# Patient Record
Sex: MALE | Employment: UNEMPLOYED | URBAN - METROPOLITAN AREA
[De-identification: names, ages, dates, MRNs, and addresses within clinical notes are randomized per-mention and may not be internally consistent; named-entity substitution may affect disease eponyms.]

---

## 2019-02-23 ENCOUNTER — HOSPITAL ENCOUNTER (EMERGENCY)
Facility: CLINIC | Age: 24
Discharge: HOME | End: 2019-02-24
Attending: NURSE PRACTITIONER | Admitting: NURSE PRACTITIONER
Payer: COMMERCIAL

## 2019-02-23 DIAGNOSIS — T50.905A ADVERSE EFFECT OF DRUG, INITIAL ENCOUNTER: ICD-10-CM

## 2019-02-23 PROCEDURE — 99283 EMERGENCY DEPT VISIT LOW MDM: CPT | Performed by: NURSE PRACTITIONER

## 2019-02-23 PROCEDURE — 25000132 ZZH RX MED GY IP 250 OP 250 PS 637: Performed by: NURSE PRACTITIONER

## 2019-02-23 PROCEDURE — 99284 EMERGENCY DEPT VISIT MOD MDM: CPT | Mod: Z6 | Performed by: NURSE PRACTITIONER

## 2019-02-23 PROCEDURE — 25000131 ZZH RX MED GY IP 250 OP 636 PS 637: Performed by: NURSE PRACTITIONER

## 2019-02-23 RX ORDER — LORAZEPAM 1 MG/1
1 TABLET ORAL ONCE
Status: COMPLETED | OUTPATIENT
Start: 2019-02-23 | End: 2019-02-23

## 2019-02-23 RX ORDER — ONDANSETRON 4 MG/1
4 TABLET, ORALLY DISINTEGRATING ORAL ONCE
Status: COMPLETED | OUTPATIENT
Start: 2019-02-23 | End: 2019-02-23

## 2019-02-23 RX ADMIN — ONDANSETRON 4 MG: 4 TABLET, ORALLY DISINTEGRATING ORAL at 23:10

## 2019-02-23 RX ADMIN — LORAZEPAM 1 MG: 1 TABLET ORAL at 23:08

## 2019-02-23 NOTE — ED AVS SNAPSHOT
[facility] Emergency Department  [address] [place] [address]  Phone:  [phone]  Fax:  [phone]                                    Deven Lake   MRN: [id number]    Department:  [facility] Emergency Department   Date of Visit:  2/23/2019           After Visit Summary Signature Page    I have received my discharge instructions, and my questions have been answered. I have discussed any challenges I see with this plan with the nurse or doctor.    ..........................................................................................................................................  Patient/Patient Representative Signature      ..........................................................................................................................................  Patient Representative Print Name and Relationship to Patient    ..................................................               ................................................  Date                                   Time    ..........................................................................................................................................  Reviewed by Signature/Title    ...................................................              ..............................................  Date                                               Time          22EPIC Rev 08/18

## 2019-02-24 ENCOUNTER — HOSPITAL ENCOUNTER (EMERGENCY)
Facility: CLINIC | Age: 24
Discharge: HOME | End: 2019-02-24
Attending: FAMILY MEDICINE | Admitting: FAMILY MEDICINE
Payer: COMMERCIAL

## 2019-02-24 ENCOUNTER — NURSE TRIAGE (OUTPATIENT)
Dept: NURSING | Facility: CLINIC | Age: 24
End: 2019-02-24

## 2019-02-24 VITALS
SYSTOLIC BLOOD PRESSURE: 123 MMHG | WEIGHT: 119 LBS | OXYGEN SATURATION: 97 % | RESPIRATION RATE: 16 BRPM | BODY MASS INDEX: 17.57 KG/M2 | TEMPERATURE: 97.3 F | HEART RATE: 79 BPM | DIASTOLIC BLOOD PRESSURE: 84 MMHG

## 2019-02-24 VITALS
TEMPERATURE: 97.3 F | DIASTOLIC BLOOD PRESSURE: 78 MMHG | SYSTOLIC BLOOD PRESSURE: 106 MMHG | HEART RATE: 62 BPM | OXYGEN SATURATION: 99 % | RESPIRATION RATE: 16 BRPM

## 2019-02-24 DIAGNOSIS — T62.0X2S: ICD-10-CM

## 2019-02-24 DIAGNOSIS — R11.0 NAUSEA: ICD-10-CM

## 2019-02-24 PROCEDURE — 99282 EMERGENCY DEPT VISIT SF MDM: CPT | Performed by: FAMILY MEDICINE

## 2019-02-24 PROCEDURE — 99284 EMERGENCY DEPT VISIT MOD MDM: CPT | Mod: Z6 | Performed by: FAMILY MEDICINE

## 2019-02-24 RX ORDER — ONDANSETRON 4 MG/1
4 TABLET, ORALLY DISINTEGRATING ORAL EVERY 8 HOURS PRN
Qty: 10 TABLET | Refills: 0 | Status: SHIPPED | OUTPATIENT
Start: 2019-02-24 | End: 2019-02-27

## 2019-02-24 ASSESSMENT — ENCOUNTER SYMPTOMS
MUSCULOSKELETAL NEGATIVE: 1
VOMITING: 1
CARDIOVASCULAR NEGATIVE: 1
TREMORS: 1
RESPIRATORY NEGATIVE: 1
NAUSEA: 1

## 2019-02-24 NOTE — TELEPHONE ENCOUNTER
"Deven is feeling dizzy.  Deven can walk but head is dizzy.  Deven was just released from hospital yesterday for ingesting mushrooms.  Denies vomiting, is hydrated.  Denies fever.      Reason for Disposition    Known or suspected alcohol or drug abuse    Additional Information    Negative: Severe difficulty breathing (e.g., struggling for each breath, speaks in single words)    Negative: [1] Difficulty breathing or swallowing AND [2] started suddenly after medicine, an allergic food or bee sting    Negative: Shock suspected (e.g., cold/pale/clammy skin, too weak to stand, low BP, rapid pulse)    Negative: Difficult to awaken or acting confused  (e.g., disoriented, slurred speech)    Negative: [1] Weakness (i.e., paralysis, loss of muscle strength) of the face, arm or leg on one side of the body AND [2] sudden onset AND [3] present now    Negative: [1] Numbness (i.e., loss of sensation) of the face, arm or leg on one side of the body AND [2] sudden onset AND [3] present now    Negative: [1] Loss of speech or garbled speech AND [2] sudden onset AND [3] present now    Negative: Overdose (accidental or intentional) of medications    Negative: [1] Fainted > 15 minutes ago AND [2] still feels too weak or dizzy to stand    Negative: Heart beating < 50 beats per minute OR > 140 beats per minute    Negative: Sounds like a life-threatening emergency to the triager    Negative: Difficulty breathing    Negative: SEVERE dizziness (e.g., unable to stand, requires support to walk, feels like passing out now)    Negative: Extra heart beats OR irregular heart beating  (i.e., \"palpitations\")    Negative: [1] Drinking very little AND [2] dehydration suspected (e.g., no urine > 12 hours, very dry mouth, very lightheaded)    Negative: Patient sounds very sick or weak to the triager    Negative: [1] Dizziness caused by heat exposure, sudden standing, or poor fluid intake AND [2] no improvement after 2 hours of rest and fluids    " Negative: [1] Fever > 103 F (39.4 C) AND [2] not able to get the fever down using Fever Care Advice    Negative: [1] Fever > 101 F (38.3 C) AND [2] age > 60    Negative: [1] Fever > 101 F (38.3 C) AND [2] bedridden (e.g., nursing home patient, CVA, chronic illness, recovering from surgery)    Negative: [1] Fever > 100.5 F (38.1 C) AND [2] diabetes mellitus or weak immune system (e.g., HIV positive, cancer chemo, splenectomy, organ transplant, chronic steroids)    Negative: [1] MODERATE dizziness (e.g., interferes with normal activities) AND [2] has NOT been evaluated by physician for this  (Exception: dizziness caused by heat exposure, sudden standing, or poor fluid intake)    Negative: Fever present > 3 days (72 hours)    Negative: Taking a medicine that could cause dizziness (e.g., blood pressure medications, diuretics)    Negative: [1] MODERATE dizziness (e.g., interferes with normal activities) AND [2] has been evaluated by physician for this    Negative: [1] MILD dizziness (e.g., walking normally) AND [2] has NOT been evaluated by physician for this  (Exception: dizziness caused by heat exposure, sudden standing, or poor fluid intake)    Protocols used: DIZZINESS - LIGHTHEADEDNESS-ADULT-

## 2019-02-24 NOTE — ED AVS SNAPSHOT
[facility] Emergency Department  [address] [place] [address]  Phone:  [phone]  Fax:  [phone]                                    Deven Lake   MRN: [id number]    Department:  [facility] Emergency Department   Date of Visit:  2/24/2019           After Visit Summary Signature Page    I have received my discharge instructions, and my questions have been answered. I have discussed any challenges I see with this plan with the nurse or doctor.    ..........................................................................................................................................  Patient/Patient Representative Signature      ..........................................................................................................................................  Patient Representative Print Name and Relationship to Patient    ..................................................               ................................................  Date                                   Time    ..........................................................................................................................................  Reviewed by Signature/Title    ...................................................              ..............................................  Date                                               Time          22EPIC Rev 08/18

## 2019-02-24 NOTE — ED TRIAGE NOTES
"Pt presents with anxiety and nausea after taking one stem of mushroom. Pt states \"he hasn't had a trip like this before.\" Pt denies other drug use.   "

## 2019-02-24 NOTE — ED PROVIDER NOTES
"  History     Chief Complaint   Patient presents with     Ingestion     Pt took one stem of mushroom at approx 2100. Pt now feels anxious and nauseated.      HPI  Deven Lake is a 23 year old male who was brought in by EMS due to ingestion of mushrooms. Patient states he \"had a bad trip\" that caused him to have excess worry. States he ingested two grams of mushrooms approximately three hours ago. He reports he has also felt nauseated and had vomited one time that coincided with an episode of sweating. Patient reports that his \"legs won't stop shaking\". He also states that his teeth and jaw are clenching, which he reports occurs every time he \"trips\". Patient feels nauseated at this time. Denies difficulty swallowing, difficulty breathing, or chest pain. Denies use of tobacco or other drugs. Denies hearing or seeing anything unexpected in the room. He denies any suicidal ideation.     Allergies:  Allergies   Allergen Reactions     No Known Allergies        Problem List:    Patient Active Problem List    Diagnosis Date Noted     Mild persistent asthma 10/31/2008     Priority: Medium        Past Medical History:    No past medical history on file.    Past Surgical History:    No past surgical history on file.    Family History:    No family history on file.    Social History:  Marital Status:  Single [1]  Social History     Tobacco Use     Smoking status: Never Smoker   Substance Use Topics     Alcohol use: No     Drug use: Yes     Comment: mushroom         Medications:      ALBUTEROL SULFATE 0.083 % IN NEBU   venlafaxine (EFFEXOR XR) 37.5 MG 24 hr capsule         Review of Systems   Constitutional:        Reports symptoms of feeling hot and cold.    HENT: Negative.    Respiratory: Negative.    Cardiovascular: Negative.    Gastrointestinal: Positive for nausea and vomiting.   Genitourinary: Negative.    Musculoskeletal: Negative.    Neurological: Positive for tremors.       Physical Exam   BP: 132/88  Pulse: " 79  Heart Rate: 79  Temp: 97.3  F (36.3  C)  Resp: 16  SpO2: 99 %      Physical Exam   Constitutional: He is oriented to person, place, and time. No distress.   HENT:   Nose: Nose normal.   Mouth/Throat: No oropharyngeal exudate.   Eyes: Pupils are equal, round, and reactive to light.   Neck: Normal range of motion. Neck supple.   Cardiovascular: Normal rate, regular rhythm, normal heart sounds and intact distal pulses.   Pulmonary/Chest: Effort normal and breath sounds normal. No respiratory distress.   Abdominal: Soft. Bowel sounds are normal. He exhibits no distension. There is no tenderness.   Neurological: He is alert and oriented to person, place, and time.   Bilateral legs minimally tremulous. No tremors present to bilateral hands. Patient denies hearing or seeing anything unexpected in the room.    Skin: Skin is warm and dry. He is not diaphoretic.       ED Course    Patient administered zofran for management of nausea, and ativan for symptoms associated with drug ingestion/anxiety. Vital signs remained stable during visit. Patient monitored for safety during his stay.        Procedures      No results found for this or any previous visit (from the past 24 hour(s)).    Medications   LORazepam (ATIVAN) tablet 1 mg (1 mg Oral Given 2/23/19 2308)   ondansetron (ZOFRAN-ODT) ODT tab 4 mg (4 mg Oral Given 2/23/19 2310)       Assessments & Plan (with Medical Decision Making)  Patient is a 23 year old male who presents due to adverse effects from the ingestion of mushrooms. Patient's vital signs remained stable during his stay.  He was administered zofran for the management of nausea, and ativan for symptoms associated with the mushroom ingestion.   Patient tolerating fluids. Given that patient denies any chest pain, difficulty breathing, or difficulty swallowing, is tolerating oral fluids, and is vitally stable, patient appears stable for discharge. He is feeling much better. Patient advised that he is unable to be  discharged to home unless he has transportation. Patient encouraged to stop using drugs. Patient is agreeable to discharge plan, and remains in the department until he has transportation home.      I have reviewed the nursing notes.    I have reviewed the findings, diagnosis, plan and need for follow up with the patient.       Medication List      There are no discharge medications for this visit.         Final diagnoses:   Adverse effect of drug, initial encounter - [facility]       2/23/2019   [facility] EMERGENCY DEPARTMENT     Jaida Tello, RANJEET CNP  02/24/19 0036

## 2019-02-24 NOTE — ED NOTES
Bed: ED04  Expected date: 2/23/19  Expected time:   Means of arrival:   Comments:  EMS mushroom ingestion :)

## 2019-02-25 ENCOUNTER — TELEPHONE (OUTPATIENT)
Dept: FAMILY MEDICINE | Facility: CLINIC | Age: 24
End: 2019-02-25

## 2019-02-25 NOTE — TELEPHONE ENCOUNTER
": 1995  PHONE #'s: [phone] (home)     PRESENTING PROBLEM:  Seen in the ER last night. Still feeling dizzy. Is this normal?    NURSING ASSESSMENT  Description:  \" I was seen for dizziness,  Nausea, and vomiting , after eating some psychodelic mushrooms that I had cooked up.  \"   Onset/duration:  19  Precip. factors:   Cooked up some psychodelic mushrooms on 19  Assoc. Sx:  Tired, still dizzy, and somewhat nauseated  Improves/worsens Sx:  Has NOT vomited since 7 pm last night.   Pain scale (1-10)   0/10  Sx specific meds:  Sent home with Zofran, but hasn't taken recently.   Last exam/Tx:  ER on 19    RECOMMENDED DISPOSITION:  Home care advice - He needs to replace the electrolytes he has lost from vomiting Saturday thru . \" I have only been drinking water. \"   RN asked when he last urinated.?  \" 20 minutes ago. Normal amount.\"   When have you last eaten?  \" I have just been nibbling on some saltine crackers since Saturday. Haven't eaten much of anything.\"    RN recommends he start drinking some Gatorade or Propel to replace the NA+ and K+ that he has lost with vomiting. Also, since he has NOT vomited since 7 PM last night, he should advance his diet. Maybe try some bland chicken noodle soup. This is high in sodium so should help with that , and has noodles in it and small amount of chicken for protein.  Could try some mashed potatoes,  Cooked noodles, cooked eggs- bland. To see feel better after getting some nutrition to feed your body.    The ER provider said to give it a few days to start feeling better. If dizziness continues > 7 days needs to see a provider.     Will comply with recommendation: YES  If further questions/concerns or if Sx do not improve, worsen or new Sx develop, call your PCP or [facility] as soon as possible.    NOTES:  Disposition was determined by the first positive assessment question, therefore all previous assessment questions were negative.  " Informed to check provider manual or call insurance company to assure coverage.    Guideline used: Dizziness  Telephone Triage Protocols for Nurses, Fifth Edition, Maegan Galvez RN  February 25, 2019

## 2019-02-25 NOTE — ED PROVIDER NOTES
History     Chief Complaint   Patient presents with     Dizziness     Pt was here last nite after doing mushrooms. Nausea.      HPI  Deven Lake is a 23 year old male who presents with concerns of continued nausea.  Patient was seen here yesterday after doing mushrooms for the first time.  Yesterday he received some Zofran and Ativan here in the department.  He was doing okay last night but then he woke up this morning he was feeling nauseous again.  Patient has been drinking water today but whenever he is tried to take anything more, he is gotten more nauseous and is throwing up.  Patient denies any abdominal pain, denies any fevers or chills.  Patient has not done any more mushrooms since seen last.    Allergies:  Allergies   Allergen Reactions     No Known Allergies        Problem List:    Patient Active Problem List    Diagnosis Date Noted     Mild persistent asthma 10/31/2008     Priority: Medium        Past Medical History:    No past medical history on file.    Past Surgical History:    No past surgical history on file.    Family History:    No family history on file.    Social History:  Marital Status:  Single [1]  Social History     Tobacco Use     Smoking status: Never Smoker   Substance Use Topics     Alcohol use: No     Drug use: Yes     Comment: mushroom         Medications:      ALBUTEROL SULFATE 0.083 % IN NEBU   venlafaxine (EFFEXOR XR) 37.5 MG 24 hr capsule         Review of Systems   All other systems reviewed and are negative.      Physical Exam          Physical Exam   Constitutional: He is oriented to person, place, and time. He appears well-developed and well-nourished. No distress.   HENT:   Head: Normocephalic and atraumatic.   Mouth/Throat: Oropharynx is clear and moist.   Eyes: Conjunctivae are normal.   Neck: Normal range of motion.   Cardiovascular: Normal rate, regular rhythm and normal heart sounds.   No murmur heard.  Pulmonary/Chest: Effort normal and breath sounds normal. No  stridor. No respiratory distress. He has no wheezes.   Abdominal: Soft. Bowel sounds are normal. He exhibits no distension. There is no tenderness. There is no guarding.   Musculoskeletal: Normal range of motion. He exhibits no edema.   Neurological: He is alert and oriented to person, place, and time. He has normal strength.   Skin: Skin is warm and dry. No rash noted. He is not diaphoretic.   Psychiatric: He has a normal mood and affect. Judgment normal.   Nursing note and vitals reviewed.      ED Course        Procedures           Exam is normal with normal vitals.  I think patient is just having residual symptoms from the mushrooms he did the other day.  I told him some of the symptoms may last for a few days.  He was not sent home with any nausea medicine yesterday so I will discharge him home with some Zofran.  Patient was told to stick with a clear liquid diet tonight and slowly advance as tolerated.  All questions were answered and patient is okay with this plan.    Assessments & Plan (with Medical Decision Making)  Mushroom intoxication, nausea     I have reviewed the nursing notes.    I have reviewed the findings, diagnosis, plan and need for follow up with the patient.              2/24/2019   [facility] EMERGENCY DEPARTMENT     John Gleason MD  02/24/19 2011

## 2019-02-25 NOTE — TELEPHONE ENCOUNTER
"Reason for Call/Nurse Assessment:  Deven calling FNA back with worsening symptoms. Reports he continues to be dizzy and is now vomiting. Reports \"I can't keep anything down\". Patient seen in ER yesterday due to ingestion of mushrooms.     RN Action/Disposition:  Patient currently at home with his 21 year old brother. Advised caller to return to the ER now due to worsening symptoms.     Caller verbalized understanding of care advice given and plans to come back to [facility] ER now. Caller had no further questions.     Sima Mcbride RN  [facility]      "

## 2019-02-26 ENCOUNTER — HOSPITAL ENCOUNTER (EMERGENCY)
Facility: CLINIC | Age: 24
Discharge: HOME | End: 2019-02-26
Attending: FAMILY MEDICINE | Admitting: FAMILY MEDICINE
Payer: COMMERCIAL

## 2019-02-26 ENCOUNTER — NURSE TRIAGE (OUTPATIENT)
Dept: NURSING | Facility: CLINIC | Age: 24
End: 2019-02-26

## 2019-02-26 VITALS
SYSTOLIC BLOOD PRESSURE: 111 MMHG | DIASTOLIC BLOOD PRESSURE: 83 MMHG | OXYGEN SATURATION: 98 % | RESPIRATION RATE: 16 BRPM | HEART RATE: 83 BPM | TEMPERATURE: 97.6 F

## 2019-02-26 DIAGNOSIS — R42 DIZZINESS: ICD-10-CM

## 2019-02-26 DIAGNOSIS — R11.0 NAUSEA: ICD-10-CM

## 2019-02-26 LAB
ALBUMIN SERPL-MCNC: 4.2 G/DL (ref 3.4–5)
ALP SERPL-CCNC: 93 U/L (ref 40–150)
ALT SERPL W P-5'-P-CCNC: 33 U/L (ref 0–70)
AMPHETAMINES UR QL: NOT DETECTED NG/ML
ANION GAP SERPL CALCULATED.3IONS-SCNC: 5 MMOL/L (ref 3–14)
AST SERPL W P-5'-P-CCNC: 17 U/L (ref 0–45)
BARBITURATES UR QL SCN: NOT DETECTED NG/ML
BASOPHILS # BLD AUTO: 0 10E9/L (ref 0–0.2)
BASOPHILS NFR BLD AUTO: 0.3 %
BENZODIAZ UR QL SCN: NOT DETECTED NG/ML
BILIRUB SERPL-MCNC: 0.5 MG/DL (ref 0.2–1.3)
BUN SERPL-MCNC: 9 MG/DL (ref 7–30)
BUPRENORPHINE UR QL: NOT DETECTED NG/ML
CALCIUM SERPL-MCNC: 8.9 MG/DL (ref 8.5–10.1)
CANNABINOIDS UR QL: ABNORMAL NG/ML
CHLORIDE SERPL-SCNC: 106 MMOL/L (ref 94–109)
CO2 SERPL-SCNC: 29 MMOL/L (ref 20–32)
COCAINE UR QL SCN: NOT DETECTED NG/ML
CREAT SERPL-MCNC: 0.89 MG/DL (ref 0.66–1.25)
D-METHAMPHET UR QL: NOT DETECTED NG/ML
DIFFERENTIAL METHOD BLD: NORMAL
EOSINOPHIL NFR BLD AUTO: 0.9 %
ERYTHROCYTE [DISTWIDTH] IN BLOOD BY AUTOMATED COUNT: 11.8 % (ref 10–15)
GFR SERPL CREATININE-BSD FRML MDRD: >90 ML/MIN/{1.73_M2}
GLUCOSE SERPL-MCNC: 122 MG/DL (ref 70–99)
HCT VFR BLD AUTO: 43.5 % (ref 40–53)
HGB BLD-MCNC: 15.3 G/DL (ref 13.3–17.7)
IMM GRANULOCYTES # BLD: 0 10E9/L (ref 0–0.4)
IMM GRANULOCYTES NFR BLD: 0.1 %
LYMPHOCYTES # BLD AUTO: 2.2 10E9/L (ref 0.8–5.3)
LYMPHOCYTES NFR BLD AUTO: 32.6 %
MCH RBC QN AUTO: 30.1 PG (ref 26.5–33)
MCHC RBC AUTO-ENTMCNC: 35.2 G/DL (ref 31.5–36.5)
MCV RBC AUTO: 86 FL (ref 78–100)
METHADONE UR QL SCN: NOT DETECTED NG/ML
MONOCYTES # BLD AUTO: 0.6 10E9/L (ref 0–1.3)
MONOCYTES NFR BLD AUTO: 9.2 %
NEUTROPHILS # BLD AUTO: 3.8 10E9/L (ref 1.6–8.3)
NEUTROPHILS NFR BLD AUTO: 56.9 %
NRBC # BLD AUTO: 0 10*3/UL
NRBC BLD AUTO-RTO: 0 /100
OPIATES UR QL SCN: NOT DETECTED NG/ML
OXYCODONE UR QL SCN: NOT DETECTED NG/ML
PCP UR QL SCN: NOT DETECTED NG/ML
PLATELET # BLD AUTO: 292 10E9/L (ref 150–450)
POTASSIUM SERPL-SCNC: 3.7 MMOL/L (ref 3.4–5.3)
PROPOXYPH UR QL: NOT DETECTED NG/ML
PROT SERPL-MCNC: 7.5 G/DL (ref 6.8–8.8)
RBC # BLD AUTO: 5.08 10E12/L (ref 4.4–5.9)
SODIUM SERPL-SCNC: 140 MMOL/L (ref 133–144)
TRICYCLICS UR QL SCN: NOT DETECTED NG/ML
WBC # BLD AUTO: 6.7 10E9/L (ref 4–11)

## 2019-02-26 PROCEDURE — 96374 THER/PROPH/DIAG INJ IV PUSH: CPT | Performed by: FAMILY MEDICINE

## 2019-02-26 PROCEDURE — 99284 EMERGENCY DEPT VISIT MOD MDM: CPT | Mod: 25 | Performed by: FAMILY MEDICINE

## 2019-02-26 PROCEDURE — 25000128 H RX IP 250 OP 636: Performed by: FAMILY MEDICINE

## 2019-02-26 PROCEDURE — 85025 COMPLETE CBC W/AUTO DIFF WBC: CPT | Performed by: FAMILY MEDICINE

## 2019-02-26 PROCEDURE — 99284 EMERGENCY DEPT VISIT MOD MDM: CPT | Mod: Z6 | Performed by: FAMILY MEDICINE

## 2019-02-26 PROCEDURE — 80306 DRUG TEST PRSMV INSTRMNT: CPT | Performed by: FAMILY MEDICINE

## 2019-02-26 PROCEDURE — 96361 HYDRATE IV INFUSION ADD-ON: CPT | Performed by: FAMILY MEDICINE

## 2019-02-26 PROCEDURE — 80053 COMPREHEN METABOLIC PANEL: CPT | Performed by: FAMILY MEDICINE

## 2019-02-26 RX ORDER — SODIUM CHLORIDE 9 MG/ML
1000 INJECTION, SOLUTION INTRAVENOUS CONTINUOUS
Status: DISCONTINUED | OUTPATIENT
Start: 2019-02-26 | End: 2019-02-27 | Stop reason: HOSPADM

## 2019-02-26 RX ORDER — ONDANSETRON 2 MG/ML
4 INJECTION INTRAMUSCULAR; INTRAVENOUS EVERY 30 MIN PRN
Status: DISCONTINUED | OUTPATIENT
Start: 2019-02-26 | End: 2019-02-27 | Stop reason: HOSPADM

## 2019-02-26 RX ADMIN — SODIUM CHLORIDE 1000 ML: 9 INJECTION, SOLUTION INTRAVENOUS at 20:16

## 2019-02-26 RX ADMIN — ONDANSETRON 4 MG: 2 INJECTION INTRAMUSCULAR; INTRAVENOUS at 20:16

## 2019-02-26 NOTE — ED AVS SNAPSHOT
[facility] Emergency Department  [address] [place] [address]  Phone:  [phone]  Fax:  [phone]                                    Deven Lake   MRN: [id number]    Department:  [facility] Emergency Department   Date of Visit:  2/26/2019           After Visit Summary Signature Page    I have received my discharge instructions, and my questions have been answered. I have discussed any challenges I see with this plan with the nurse or doctor.    ..........................................................................................................................................  Patient/Patient Representative Signature      ..........................................................................................................................................  Patient Representative Print Name and Relationship to Patient    ..................................................               ................................................  Date                                   Time    ..........................................................................................................................................  Reviewed by Signature/Title    ...................................................              ..............................................  Date                                               Time          22EPIC Rev 08/18

## 2019-02-27 ENCOUNTER — NURSE TRIAGE (OUTPATIENT)
Dept: NURSING | Facility: CLINIC | Age: 24
End: 2019-02-27

## 2019-02-27 ENCOUNTER — HOSPITAL ENCOUNTER (EMERGENCY)
Facility: CLINIC | Age: 24
Discharge: HOME | End: 2019-02-27
Attending: FAMILY MEDICINE | Admitting: FAMILY MEDICINE
Payer: COMMERCIAL

## 2019-02-27 VITALS
OXYGEN SATURATION: 99 % | DIASTOLIC BLOOD PRESSURE: 70 MMHG | RESPIRATION RATE: 16 BRPM | HEART RATE: 70 BPM | HEIGHT: 69 IN | BODY MASS INDEX: 17.63 KG/M2 | SYSTOLIC BLOOD PRESSURE: 110 MMHG | TEMPERATURE: 98.1 F | WEIGHT: 119 LBS

## 2019-02-27 DIAGNOSIS — G47.00 INSOMNIA, UNSPECIFIED TYPE: ICD-10-CM

## 2019-02-27 DIAGNOSIS — R11.0 NAUSEA: ICD-10-CM

## 2019-02-27 LAB
ALBUMIN SERPL-MCNC: 4.4 G/DL (ref 3.4–5)
ALP SERPL-CCNC: 93 U/L (ref 40–150)
ALT SERPL W P-5'-P-CCNC: 33 U/L (ref 0–70)
ANION GAP SERPL CALCULATED.3IONS-SCNC: 8 MMOL/L (ref 3–14)
AST SERPL W P-5'-P-CCNC: 25 U/L (ref 0–45)
BASOPHILS # BLD AUTO: 0 10E9/L (ref 0–0.2)
BASOPHILS NFR BLD AUTO: 0.3 %
BILIRUB SERPL-MCNC: 0.8 MG/DL (ref 0.2–1.3)
BUN SERPL-MCNC: 8 MG/DL (ref 7–30)
CALCIUM SERPL-MCNC: 8.8 MG/DL (ref 8.5–10.1)
CHLORIDE SERPL-SCNC: 106 MMOL/L (ref 94–109)
CO2 SERPL-SCNC: 26 MMOL/L (ref 20–32)
CREAT SERPL-MCNC: 0.86 MG/DL (ref 0.66–1.25)
DIFFERENTIAL METHOD BLD: NORMAL
EOSINOPHIL NFR BLD AUTO: 0.1 %
ERYTHROCYTE [DISTWIDTH] IN BLOOD BY AUTOMATED COUNT: 11.9 % (ref 10–15)
GFR SERPL CREATININE-BSD FRML MDRD: >90 ML/MIN/{1.73_M2}
GLUCOSE SERPL-MCNC: 106 MG/DL (ref 70–99)
HCT VFR BLD AUTO: 41.2 % (ref 40–53)
HGB BLD-MCNC: 14.4 G/DL (ref 13.3–17.7)
IMM GRANULOCYTES # BLD: 0 10E9/L (ref 0–0.4)
IMM GRANULOCYTES NFR BLD: 0.3 %
LIPASE SERPL-CCNC: 86 U/L (ref 73–393)
LYMPHOCYTES # BLD AUTO: 1.5 10E9/L (ref 0.8–5.3)
LYMPHOCYTES NFR BLD AUTO: 19.7 %
MCH RBC QN AUTO: 30.2 PG (ref 26.5–33)
MCHC RBC AUTO-ENTMCNC: 35 G/DL (ref 31.5–36.5)
MCV RBC AUTO: 86 FL (ref 78–100)
MONOCYTES # BLD AUTO: 0.5 10E9/L (ref 0–1.3)
MONOCYTES NFR BLD AUTO: 6.2 %
NEUTROPHILS # BLD AUTO: 5.6 10E9/L (ref 1.6–8.3)
NEUTROPHILS NFR BLD AUTO: 73.4 %
NRBC # BLD AUTO: 0 10*3/UL
NRBC BLD AUTO-RTO: 0 /100
PLATELET # BLD AUTO: 279 10E9/L (ref 150–450)
POTASSIUM SERPL-SCNC: 4.1 MMOL/L (ref 3.4–5.3)
PROT SERPL-MCNC: 8 G/DL (ref 6.8–8.8)
RBC # BLD AUTO: 4.77 10E12/L (ref 4.4–5.9)
SODIUM SERPL-SCNC: 140 MMOL/L (ref 133–144)
WBC # BLD AUTO: 7.6 10E9/L (ref 4–11)

## 2019-02-27 PROCEDURE — 80053 COMPREHEN METABOLIC PANEL: CPT | Performed by: FAMILY MEDICINE

## 2019-02-27 PROCEDURE — 36415 COLL VENOUS BLD VENIPUNCTURE: CPT | Performed by: FAMILY MEDICINE

## 2019-02-27 PROCEDURE — 96374 THER/PROPH/DIAG INJ IV PUSH: CPT | Performed by: FAMILY MEDICINE

## 2019-02-27 PROCEDURE — 25000128 H RX IP 250 OP 636: Performed by: FAMILY MEDICINE

## 2019-02-27 PROCEDURE — 83690 ASSAY OF LIPASE: CPT | Performed by: FAMILY MEDICINE

## 2019-02-27 PROCEDURE — 96361 HYDRATE IV INFUSION ADD-ON: CPT | Performed by: FAMILY MEDICINE

## 2019-02-27 PROCEDURE — 99284 EMERGENCY DEPT VISIT MOD MDM: CPT | Mod: 25 | Performed by: FAMILY MEDICINE

## 2019-02-27 PROCEDURE — 99284 EMERGENCY DEPT VISIT MOD MDM: CPT | Mod: Z6 | Performed by: FAMILY MEDICINE

## 2019-02-27 PROCEDURE — 85025 COMPLETE CBC W/AUTO DIFF WBC: CPT | Performed by: FAMILY MEDICINE

## 2019-02-27 RX ORDER — ONDANSETRON 2 MG/ML
4 INJECTION INTRAMUSCULAR; INTRAVENOUS EVERY 30 MIN PRN
Status: DISCONTINUED | OUTPATIENT
Start: 2019-02-27 | End: 2019-02-27 | Stop reason: HOSPADM

## 2019-02-27 RX ORDER — HYDROXYZINE PAMOATE 25 MG/1
25 CAPSULE ORAL 3 TIMES DAILY PRN
Qty: 10 CAPSULE | Refills: 0 | Status: SHIPPED | OUTPATIENT
Start: 2019-02-27

## 2019-02-27 RX ADMIN — SODIUM CHLORIDE 1000 ML: 9 INJECTION, SOLUTION INTRAVENOUS at 12:41

## 2019-02-27 RX ADMIN — ONDANSETRON 4 MG: 2 INJECTION INTRAMUSCULAR; INTRAVENOUS at 12:42

## 2019-02-27 ASSESSMENT — MIFFLIN-ST. JEOR: SCORE: 1530.16

## 2019-02-27 NOTE — ED NOTES
Tiffanie from poison control called.  Discussed Lab results and current status of pt. Tiffanie does not known why pt is having symptoms, and it would be symptomatic support from here on.  Poison control has cleared the pt from there service.

## 2019-02-27 NOTE — ED PROVIDER NOTES
History     Chief Complaint   Patient presents with     Nausea     HPI  Deven Lake is a 23 year old male who presents for the fourth visit for continued nausea that started after using mushrooms.  Patient states that he is still having some nausea but continues to have no vomiting.  He felt somewhat lightheaded today and feels like his eyes are dilated.  Patient is having trouble sleeping, he did not sleep much last night.  Patient denies any fevers or chills, denies any significant pain anywhere.    Summary of recent ER visits:  2/23/2019: Seen initially in the ER, was given some Zofran and Ativan and felt much better from his symptoms.  Patient was discharged home  2/24/2019: Patient was seen again in the emergency department for continued symptoms, this time patient was sent home with oral Zofran.  Patient had normal vitals and a normal exam.  2/26/2019: Patient was seen in the emergency department again, patient had labs done which included normal liver function testing, was given IV fluids and some IV Zofran.  Candor better and was discharged home.    Allergies:  Allergies   Allergen Reactions     No Known Allergies        Problem List:    Patient Active Problem List    Diagnosis Date Noted     Mild persistent asthma 10/31/2008     Priority: Medium        Past Medical History:    Past Medical History:   Diagnosis Date     Depressive disorder      Uncomplicated asthma        Past Surgical History:    History reviewed. No pertinent surgical history.    Family History:    History reviewed. No pertinent family history.    Social History:  Marital Status:  Single [1]  Social History     Tobacco Use     Smoking status: Never Smoker     Smokeless tobacco: Never Used   Substance Use Topics     Alcohol use: No     Drug use: Yes     Comment: mushroom saturday        Medications:      ALBUTEROL SULFATE 0.083 % IN NEBU   ondansetron (ZOFRAN ODT) 4 MG ODT tab   venlafaxine (EFFEXOR XR) 37.5 MG 24 hr capsule  "        Review of Systems   All other systems reviewed and are negative.      Physical Exam   BP: 121/78  Pulse: 71  Temp: 98.1  F (36.7  C)  Resp: 16  Height: 175.3 cm (5' 9\")  Weight: 54 kg (119 lb)  SpO2: 99 %  Lying Orthostatic BP: 107/69  Lying Orthostatic Pulse: 64 bpm  Sitting Orthostatic BP: 117/74  Sitting Orthostatic Pulse: 71 bpm  Standing Orthostatic BP: 117/82  Standing Orthostatic Pulse: 87 bpm      Physical Exam   Constitutional: He is oriented to person, place, and time. He appears well-developed and well-nourished. No distress.   HENT:   Head: Normocephalic and atraumatic.   Eyes: EOM are normal. Pupils are equal, round, and reactive to light.   Neck: Normal range of motion. Neck supple.   Cardiovascular: Normal rate, regular rhythm and normal heart sounds.   No murmur heard.  Pulmonary/Chest: Effort normal and breath sounds normal. No stridor. No respiratory distress. He has no wheezes.   Abdominal: Soft. Bowel sounds are normal. He exhibits no distension. There is no tenderness.   Musculoskeletal: He exhibits no edema.   Neurological: He is alert and oriented to person, place, and time. No cranial nerve deficit. Coordination normal.   Skin: Skin is warm and dry. No rash noted. He is not diaphoretic.   Nursing note and vitals reviewed.      ED Course        Procedures             Results for orders placed or performed during the hospital encounter of 02/27/19 (from the past 24 hour(s))   CBC with platelets differential   Result Value Ref Range    WBC 7.6 4.0 - 11.0 10e9/L    RBC Count 4.77 4.4 - 5.9 10e12/L    Hemoglobin 14.4 13.3 - 17.7 g/dL    Hematocrit 41.2 40.0 - 53.0 %    MCV 86 78 - 100 fl    MCH 30.2 26.5 - 33.0 pg    MCHC 35.0 31.5 - 36.5 g/dL    RDW 11.9 10.0 - 15.0 %    Platelet Count 279 150 - 450 10e9/L    Diff Method Automated Method     % Neutrophils 73.4 %    % Lymphocytes 19.7 %    % Monocytes 6.2 %    % Eosinophils 0.1 %    % Basophils 0.3 %    % Immature Granulocytes 0.3 %    " Nucleated RBCs 0 0 /100    Absolute Neutrophil 5.6 1.6 - 8.3 10e9/L    Absolute Lymphocytes 1.5 0.8 - 5.3 10e9/L    Absolute Monocytes 0.5 0.0 - 1.3 10e9/L    Absolute Basophils 0.0 0.0 - 0.2 10e9/L    Abs Immature Granulocytes 0.0 0 - 0.4 10e9/L    Absolute Nucleated RBC 0.0    Comprehensive metabolic panel   Result Value Ref Range    Sodium 140 133 - 144 mmol/L    Potassium 4.1 3.4 - 5.3 mmol/L    Chloride 106 94 - 109 mmol/L    Carbon Dioxide 26 20 - 32 mmol/L    Anion Gap 8 3 - 14 mmol/L    Glucose 106 (H) 70 - 99 mg/dL    Urea Nitrogen 8 7 - 30 mg/dL    Creatinine 0.86 0.66 - 1.25 mg/dL    GFR Estimate >90 >60 mL/min/[1.73_m2]    GFR Estimate If Black >90 >60 mL/min/[1.73_m2]    Calcium 8.8 8.5 - 10.1 mg/dL    Bilirubin Total 0.8 0.2 - 1.3 mg/dL    Albumin 4.4 3.4 - 5.0 g/dL    Protein Total 8.0 6.8 - 8.8 g/dL    Alkaline Phosphatase 93 40 - 150 U/L    ALT 33 0 - 70 U/L    AST 25 0 - 45 U/L   Lipase   Result Value Ref Range    Lipase 86 73 - 393 U/L       Medications   0.9% sodium chloride BOLUS (not administered)   ondansetron (ZOFRAN) injection 4 mg (not administered)     Once again patient's labs all continue to be normal.  I think the patient with this continued nausea has some undiagnosed anxiety and the use just exacerbated everything.  He really needs to be seen in the clinic.  Unfortunately had an appointment today but was told by the nurse triage line to go to the ER.  I really hope in the future that he will go to the clinic and not come back to the ER for this.  I think they need to be a discussion about getting started something for anxiety and/or depression issues.  This needs to be initiated from the clinic.  Patient has Zofran that he will continue to use at home.  He is really concerned about his lack of sleep so I am going to try him on some hydroxyzine that he can use to help with this.  At this point all questions have been answered and patient is safe to be discharged home.      Assessments  & Plan (with Medical Decision Making)  Nausea, insomnia     I have reviewed the nursing notes.    I have reviewed the findings, diagnosis, plan and need for follow up with the patient.              2/27/2019   [facility] EMERGENCY DEPARTMENT     John Gleason MD  02/27/19 2500

## 2019-02-27 NOTE — TELEPHONE ENCOUNTER
"Deven calling in with concerns that he was recently seen in the ED and , \" I stiil feel dizzy and I am tired and I might be seeing little things that aren't there.\"  Denies auditory hallucinations. States he is possibly \"seeing little things that might not be there.\"  States that his brother is with him and can bring him to the ED.     Disposition: ED. Deven verbalizes understanding and states that he will go in.  RN advised to call back with any changes, worsening of symptoms, and questions or concerns.   Zamzam Li RN/FNA       Reason for Disposition    Patient sounds very sick or weak to the triager    Seeing, hearing, or feeling things that are not there (i.e., visual, auditory, or tactile hallucinations)    Additional Information    Negative: Difficulty breathing    Negative: SEVERE dizziness (e.g., unable to stand, requires support to walk, feels like passing out now)    Negative: Extra heart beats OR irregular heart beating  (i.e., \"palpitations\")    Negative: [1] Drinking very little AND [2] dehydration suspected (e.g., no urine > 12 hours, very dry mouth, very lightheaded)    Negative: Patient sounds very sick or weak to the triager    Negative: [1] Dizziness caused by heat exposure, sudden standing, or poor fluid intake AND [2] no improvement after 2 hours of rest and fluids    Negative: [1] Fever > 103 F (39.4 C) AND [2] not able to get the fever down using Fever Care Advice    Negative: [1] Fever > 101 F (38.3 C) AND [2] age > 60    Negative: [1] Fever > 101 F (38.3 C) AND [2] bedridden (e.g., nursing home patient, CVA, chronic illness, recovering from surgery)    Negative: [1] Fever > 100.5 F (38.1 C) AND [2] diabetes mellitus or weak immune system (e.g., HIV positive, cancer chemo, splenectomy, organ transplant, chronic steroids)    Negative: [1] MODERATE dizziness (e.g., interferes with normal activities) AND [2] has NOT been evaluated by physician for this  (Exception: dizziness caused " by heat exposure, sudden standing, or poor fluid intake)    Protocols used: CONFUSION - DELIRIUM-ADULT-AH, DIZZINESS - LIGHTHEADEDNESS-ADULT-AH

## 2019-02-27 NOTE — ED PROVIDER NOTES
History     Chief Complaint   Patient presents with     Dizziness     The history is provided by the patient.     Deven Lake is a 23 year old male who presents to the emergency department for dizziness. Patient was seen on 2/23/19 after taking mushrooms. Patient reports not feeling any better and unable to sleep for more than an hour at a time. He states his body is weak, unsteady and still has some nausea. He denies any vomiting within the last 24 hours, headache or abdominal pain. He states he was watching TV and seeing bright lights jetting out from the sides tonight. He denies using any Mushrooms since 2/23/19. He denies using any alcohol or smoking cigarettes. He states he does occasionally smoke marijuana, however, not recently.    Allergies:  Allergies   Allergen Reactions     No Known Allergies        Problem List:    Patient Active Problem List    Diagnosis Date Noted     Mild persistent asthma 10/31/2008     Priority: Medium        Past Medical History:    Past Medical History:   Diagnosis Date     Depressive disorder      Uncomplicated asthma        Past Surgical History:    History reviewed. No pertinent surgical history.    Family History:    History reviewed. No pertinent family history.    Social History:  Marital Status:  Single [1]  Social History     Tobacco Use     Smoking status: Never Smoker     Smokeless tobacco: Never Used   Substance Use Topics     Alcohol use: No     Drug use: Yes     Comment: mushroom saturday        Medications:      ondansetron (ZOFRAN ODT) 4 MG ODT tab   ALBUTEROL SULFATE 0.083 % IN NEBU   venlafaxine (EFFEXOR XR) 37.5 MG 24 hr capsule         Review of Systems   All other systems reviewed and are negative.      Physical Exam   BP: 130/84  Pulse: 83  Temp: 97.6  F (36.4  C)  Resp: 18  SpO2: 97 %      Physical Exam   Constitutional: He is oriented to person, place, and time. He appears well-developed and well-nourished. No distress.   HENT:   Head: Normocephalic.    Right Ear: External ear normal.   Left Ear: External ear normal.   Mouth/Throat: No oropharyngeal exudate.   Eyes: EOM are normal. Pupils are equal, round, and reactive to light.   Neck: Neck supple.   Cardiovascular: Normal rate, regular rhythm and intact distal pulses.   No murmur heard.  Pulmonary/Chest: Effort normal and breath sounds normal. No respiratory distress.   Abdominal: Soft. Bowel sounds are normal. He exhibits no distension. There is no tenderness.   Musculoskeletal: Normal range of motion.   Neurological: He is alert and oriented to person, place, and time. He has normal strength. No cranial nerve deficit or sensory deficit. He displays a negative Romberg sign. Coordination and gait normal. GCS eye subscore is 4. GCS verbal subscore is 5. GCS motor subscore is 6.   Skin: Skin is warm and dry. No erythema.   Psychiatric: He has a normal mood and affect.   Nursing note and vitals reviewed.      ED Course  (with Medical Decision Making)      23-year-old with persistent dizziness and nausea after using mushrooms about 4 days ago.  Has not used anything since.  This is his third ED visit with persistent symptoms although they are not as severe as before.  I spoke with poison control and they suggested checking his LFTs if they had been checked previously.  He denies taking any other medications including ibuprofen or Tylenol.  Has used some Zofran with decent effect.  Able to eat and drink.  Just feels a little bit off and in a fog.  Sleep is been poor.  Seeing some light next of the TV earlier tonight.  He drove himself here.    CBC was unremarkable.  Comprehensive profile including LFTs were all normal.  Urine tox screen was positive only for cannabinoids which he admitted to but has not used in several days.  He was given a liter of normal saline and some Zofran while waiting for his labs to come back.  He is feeling a bit better and feels ready to go home.  Poison control called back to check up on  him and felt since his LFTs were normal, that he was cleared from a toxicology standpoint and treatment would be supportive.  We could have used some Ativan for symptom control if needed but he drove himself.    We discussed avoiding use of illicit drugs and at this time, he seems motivated and does not want to feel like this again.  I expect that this may take a few more days to settle down.  I suggested that he recheck in clinic if his problems persist.  I am not finding anything focal on his exam right now and think it is just going to take some time.          Procedures               Critical Care time:  none               Results for orders placed or performed during the hospital encounter of 02/26/19 (from the past 24 hour(s))   CBC with platelets differential   Result Value Ref Range    WBC 6.7 4.0 - 11.0 10e9/L    RBC Count 5.08 4.4 - 5.9 10e12/L    Hemoglobin 15.3 13.3 - 17.7 g/dL    Hematocrit 43.5 40.0 - 53.0 %    MCV 86 78 - 100 fl    MCH 30.1 26.5 - 33.0 pg    MCHC 35.2 31.5 - 36.5 g/dL    RDW 11.8 10.0 - 15.0 %    Platelet Count 292 150 - 450 10e9/L    Diff Method Automated Method     % Neutrophils 56.9 %    % Lymphocytes 32.6 %    % Monocytes 9.2 %    % Eosinophils 0.9 %    % Basophils 0.3 %    % Immature Granulocytes 0.1 %    Nucleated RBCs 0 0 /100    Absolute Neutrophil 3.8 1.6 - 8.3 10e9/L    Absolute Lymphocytes 2.2 0.8 - 5.3 10e9/L    Absolute Monocytes 0.6 0.0 - 1.3 10e9/L    Absolute Basophils 0.0 0.0 - 0.2 10e9/L    Abs Immature Granulocytes 0.0 0 - 0.4 10e9/L    Absolute Nucleated RBC 0.0    Comprehensive metabolic panel   Result Value Ref Range    Sodium 140 133 - 144 mmol/L    Potassium 3.7 3.4 - 5.3 mmol/L    Chloride 106 94 - 109 mmol/L    Carbon Dioxide 29 20 - 32 mmol/L    Anion Gap 5 3 - 14 mmol/L    Glucose 122 (H) 70 - 99 mg/dL    Urea Nitrogen 9 7 - 30 mg/dL    Creatinine 0.89 0.66 - 1.25 mg/dL    GFR Estimate >90 >60 mL/min/[1.73_m2]    GFR Estimate If Black >90 >60  mL/min/[1.73_m2]    Calcium 8.9 8.5 - 10.1 mg/dL    Bilirubin Total 0.5 0.2 - 1.3 mg/dL    Albumin 4.2 3.4 - 5.0 g/dL    Protein Total 7.5 6.8 - 8.8 g/dL    Alkaline Phosphatase 93 40 - 150 U/L    ALT 33 0 - 70 U/L    AST 17 0 - 45 U/L   Urine Drugs of Abuse Screen Panel 13   Result Value Ref Range    Cannabinoids (32-[id number]-5-carboxy-9-THC) Detected, Abnormal Result (A) NDET^Not Detected ng/mL    Phencyclidine (Phencyclidine) Not Detected NDET^Not Detected ng/mL    Cocaine (Benzoylecgonine) Not Detected NDET^Not Detected ng/mL    Methamphetamine (d-Methamphetamine) Not Detected NDET^Not Detected ng/mL    Opiates (Morphine) Not Detected NDET^Not Detected ng/mL    Amphetamine (d-Amphetamine) Not Detected NDET^Not Detected ng/mL    Benzodiazepines (Nordiazepam) Not Detected NDET^Not Detected ng/mL    Tricyclic Antidepressants (Desipramine) Not Detected NDET^Not Detected ng/mL    Methadone (Methadone) Not Detected NDET^Not Detected ng/mL    Barbiturates (Butalbital) Not Detected NDET^Not Detected ng/mL    Oxycodone (Oxycodone) Not Detected NDET^Not Detected ng/mL    Propoxyphene (Norpropoxyphene) Not Detected NDET^Not Detected ng/mL    Buprenorphine (Buprenorphine) Not Detected NDET^Not Detected ng/mL       Medications   0.9% sodium chloride BOLUS (0 mLs Intravenous Stopped 2/26/19 2100)     Followed by   sodium chloride 0.9% infusion (not administered)   ondansetron (ZOFRAN) injection 4 mg (4 mg Intravenous Given 2/26/19 2016)       Assessments & Plan      I have reviewed the nursing notes.    I have reviewed the findings, diagnosis, plan and need for follow up with the patient.          Medication List      There are no discharge medications for this visit.         Final diagnoses:   Dizziness   Nausea     This document serves as a record of services personally performed by Darrel Perry MD. It was created on their behalf by Melissa Davis, a trained medical scribe. The creation of this record is based on the provider's  personal observations and the statements of the patient. This document has been checked and approved by the attending provider.    Note: Chart documentation done in part with Dragon Voice Recognition software. Although reviewed after completion, some word and grammatical errors may remain.    2/26/2019   [facility] EMERGENCY DEPARTMENT     Janak Perry MD  02/26/19 1651

## 2019-02-27 NOTE — ED AVS SNAPSHOT
[facility] Emergency Department  [address] [place] [address]  Phone:  [phone]  Fax:  [phone]                                    Deven Lake   MRN: [id number]    Department:  [facility] Emergency Department   Date of Visit:  2/27/2019           After Visit Summary Signature Page    I have received my discharge instructions, and my questions have been answered. I have discussed any challenges I see with this plan with the nurse or doctor.    ..........................................................................................................................................  Patient/Patient Representative Signature      ..........................................................................................................................................  Patient Representative Print Name and Relationship to Patient    ..................................................               ................................................  Date                                   Time    ..........................................................................................................................................  Reviewed by Signature/Title    ...................................................              ..............................................  Date                                               Time          22EPIC Rev 08/18

## 2019-02-27 NOTE — TELEPHONE ENCOUNTER
"Dilated pupils not listed in UpToDate as a side effect of zofran, which he took. He is having dizziness and a dry mouth. He has had some urine output today.  He said he is going to drive himself to the ER. I advised he needs to have someone else drive him. He understands.  Leona Pate RN-BC  [facility]      Reason for Disposition    [1] Drinking very little AND [2] dehydration suspected (e.g., no urine > 12 hours, very dry mouth, very lightheaded)    Additional Information    Negative: Severe difficulty breathing (e.g., struggling for each breath, speaks in single words)    Negative: [1] Difficulty breathing or swallowing AND [2] started suddenly after medicine, an allergic food or bee sting    Negative: Shock suspected (e.g., cold/pale/clammy skin, too weak to stand, low BP, rapid pulse)    Negative: Difficult to awaken or acting confused  (e.g., disoriented, slurred speech)    Negative: [1] Weakness (i.e., paralysis, loss of muscle strength) of the face, arm or leg on one side of the body AND [2] sudden onset AND [3] present now    Negative: [1] Numbness (i.e., loss of sensation) of the face, arm or leg on one side of the body AND [2] sudden onset AND [3] present now    Negative: [1] Loss of speech or garbled speech AND [2] sudden onset AND [3] present now    Negative: Overdose (accidental or intentional) of medications    Negative: [1] Fainted > 15 minutes ago AND [2] still feels too weak or dizzy to stand    Negative: Heart beating < 50 beats per minute OR > 140 beats per minute    Negative: Sounds like a life-threatening emergency to the triager    Negative: Chest pain    Negative: Rectal bleeding, bloody stool, or tarry-black stool    Negative: [1] Vomiting AND [2] contains red blood or black (\"coffee ground\") material    Negative: Vomiting is main symptom    Negative: Diarrhea is main symptom    Negative: Headache is main symptom    Negative: Patient states that he/she is having an " "anxiety/panic attack    Negative: Dizziness from low blood sugar (i.e., < 60 mg/dl or 3.5 mmol/l)    Negative: Dizziness is described as a spinning sensation (i.e., vertigo)    Negative: Heat exhaustion suspected    Negative: Difficulty breathing    Negative: SEVERE dizziness (e.g., unable to stand, requires support to walk, feels like passing out now)    Negative: Extra heart beats OR irregular heart beating  (i.e., \"palpitations\")    Protocols used: DIZZINESS - LIGHTHEADEDNESS-ADULT-AH      "

## 2019-02-27 NOTE — DISCHARGE INSTRUCTIONS
Diet and activity as tolerated.  You may use the Zofran ODT as needed for nausea.  Encourage fluids.  If you ever think of using mushrooms or other illicit drugs in the future, remember how bad you have felt this past week.  Try hard to avoid future use.  It was nice visiting with you tonight.  I truly wish you the very best going forward and hope you feel better soon.    Thank you for choosing [facility]. We appreciate the opportunity to meet your urgent medical needs. Please let us know if we could have done anything to make your stay more satisfying.    After discharge, please closely monitor for any new or worsening symptoms. Return to the Emergency Department if you develop any acute worsening signs or symptoms.    If you had lab work, cultures or imaging studies done during your stay, the final results may still be pending. We will call you if your plan of care needs to change. However, if you are not improving as expected, please follow up with your primary care provider or clinic.     Start any prescription medications that were prescribed to you and take them as directed.     Please see additional handouts that may be pertinent to your condition.

## 2019-02-27 NOTE — ED TRIAGE NOTES
" has been nauseated for a few days. Seen Sat in the ED and sent home with zofran. Has been helping.  has been taking fluids but feels dry. Dizzy since Saturday. No diarrhea or fever. Eyes are bothering him. States \" I'm not sure why. Prob because I have not been sleeping\".  Not sure why he is not sleeping.  "

## 2019-02-28 ENCOUNTER — APPOINTMENT (OUTPATIENT)
Dept: GENERAL RADIOLOGY | Facility: CLINIC | Age: 24
End: 2019-02-28
Attending: FAMILY MEDICINE
Payer: COMMERCIAL

## 2019-02-28 ENCOUNTER — NURSE TRIAGE (OUTPATIENT)
Dept: NURSING | Facility: CLINIC | Age: 24
End: 2019-02-28

## 2019-02-28 ENCOUNTER — HOSPITAL ENCOUNTER (EMERGENCY)
Facility: CLINIC | Age: 24
Discharge: HOME | End: 2019-02-28
Attending: FAMILY MEDICINE | Admitting: FAMILY MEDICINE
Payer: COMMERCIAL

## 2019-02-28 VITALS
BODY MASS INDEX: 17.57 KG/M2 | OXYGEN SATURATION: 98 % | SYSTOLIC BLOOD PRESSURE: 112 MMHG | HEART RATE: 77 BPM | DIASTOLIC BLOOD PRESSURE: 77 MMHG | TEMPERATURE: 98.1 F | RESPIRATION RATE: 16 BRPM | WEIGHT: 119 LBS

## 2019-02-28 VITALS
WEIGHT: 119 LBS | RESPIRATION RATE: 14 BRPM | TEMPERATURE: 97.8 F | BODY MASS INDEX: 17.57 KG/M2 | SYSTOLIC BLOOD PRESSURE: 112 MMHG | OXYGEN SATURATION: 98 % | DIASTOLIC BLOOD PRESSURE: 73 MMHG

## 2019-02-28 DIAGNOSIS — R10.9 LEFT SIDED ABDOMINAL PAIN: ICD-10-CM

## 2019-02-28 DIAGNOSIS — R10.13 ABDOMINAL PAIN, EPIGASTRIC: ICD-10-CM

## 2019-02-28 DIAGNOSIS — T50.905A MEDICATION SIDE EFFECTS, INITIAL ENCOUNTER: ICD-10-CM

## 2019-02-28 LAB
ALBUMIN UR-MCNC: NEGATIVE MG/DL
AMPHETAMINES UR QL: NOT DETECTED NG/ML
APPEARANCE UR: CLEAR
BARBITURATES UR QL SCN: NOT DETECTED NG/ML
BENZODIAZ UR QL SCN: NOT DETECTED NG/ML
BILIRUB UR QL STRIP: NEGATIVE
BUPRENORPHINE UR QL: NOT DETECTED NG/ML
CANNABINOIDS UR QL: ABNORMAL NG/ML
COCAINE UR QL SCN: NOT DETECTED NG/ML
COLOR UR AUTO: YELLOW
D-METHAMPHET UR QL: NOT DETECTED NG/ML
GLUCOSE UR STRIP-MCNC: NEGATIVE MG/DL
HGB UR QL STRIP: NEGATIVE
KETONES UR STRIP-MCNC: NEGATIVE MG/DL
LEUKOCYTE ESTERASE UR QL STRIP: NEGATIVE
METHADONE UR QL SCN: NOT DETECTED NG/ML
NITRATE UR QL: NEGATIVE
OPIATES UR QL SCN: NOT DETECTED NG/ML
OXYCODONE UR QL SCN: NOT DETECTED NG/ML
PCP UR QL SCN: NOT DETECTED NG/ML
PH UR STRIP: 6 PH (ref 5–7)
PROPOXYPH UR QL: NOT DETECTED NG/ML
SOURCE: NORMAL
SP GR UR STRIP: 1.01 (ref 1–1.03)
TRICYCLICS UR QL SCN: NOT DETECTED NG/ML
UROBILINOGEN UR STRIP-MCNC: 0 MG/DL (ref 0–2)

## 2019-02-28 PROCEDURE — 2894A VOIDCORRECT: CPT | Mod: Z6 | Performed by: FAMILY MEDICINE

## 2019-02-28 PROCEDURE — 81003 URINALYSIS AUTO W/O SCOPE: CPT | Performed by: FAMILY MEDICINE

## 2019-02-28 PROCEDURE — 99284 EMERGENCY DEPT VISIT MOD MDM: CPT | Performed by: FAMILY MEDICINE

## 2019-02-28 PROCEDURE — 80306 DRUG TEST PRSMV INSTRMNT: CPT | Performed by: FAMILY MEDICINE

## 2019-02-28 PROCEDURE — 74019 RADEX ABDOMEN 2 VIEWS: CPT | Mod: TC

## 2019-02-28 PROCEDURE — 99284 EMERGENCY DEPT VISIT MOD MDM: CPT | Mod: Z6 | Performed by: FAMILY MEDICINE

## 2019-02-28 PROCEDURE — 99282 EMERGENCY DEPT VISIT SF MDM: CPT | Performed by: FAMILY MEDICINE

## 2019-02-28 NOTE — ED AVS SNAPSHOT
[facility] Emergency Department  [address] [place] [address]  Phone:  [phone]  Fax:  [phone]                                    Deven Lake   MRN: [id number]    Department:  [facility] Emergency Department   Date of Visit:  2/28/2019           After Visit Summary Signature Page    I have received my discharge instructions, and my questions have been answered. I have discussed any challenges I see with this plan with the nurse or doctor.    ..........................................................................................................................................  Patient/Patient Representative Signature      ..........................................................................................................................................  Patient Representative Print Name and Relationship to Patient    ..................................................               ................................................  Date                                   Time    ..........................................................................................................................................  Reviewed by Signature/Title    ...................................................              ..............................................  Date                                               Time          22EPIC Rev 08/18

## 2019-02-28 NOTE — ED PROVIDER NOTES
ED Provider Note     Deven Lake  [id number]  2019      CC:     Chief Complaint   Patient presents with     Medication Reaction          History is obtained from the patient.    HPI: Deven Lake is a 23 year old male presenting with possible medication side effects this morning.  Patient took his first dose of hydroxyzine at bedtime last night to help with insomnia.  He awoke at around 4 AM with palpitations, dizziness, and mid abdominal pain.  Patient had taken mushrooms for the first time earlier this week, and had symptoms of abdominal pain, nausea and vomiting.  He returned to the ED 2 days ago, and has not had any signs of liver toxicity.  He was feeling better yesterday and feels that his symptoms this morning was related to taking the hydroxyzine.  He has not used any further drugs.  Patient feels better, and the palpitations and dizziness has resolved.  He has not had a bowel movement in 2-3 days.      PMH/Problem List:   Past Medical History:   Diagnosis Date     Depressive disorder      Uncomplicated asthma        PSH: History reviewed. No pertinent surgical history.    MEDS:   Current Facility-Administered Medications on File Prior to Encounter:  [COMPLETED] 0.9% sodium chloride BOLUS     Current Outpatient Medications on File Prior to Encounter:  ALBUTEROL SULFATE 0.083 % IN NEBU 1 prefilled vial per neb Q 4 hrs, prn   hydrOXYzine (VISTARIL) 25 MG capsule Take 1 capsule (25 mg) by mouth 3 times daily as needed for anxiety or other (trouble sleeping)   [] ondansetron (ZOFRAN ODT) 4 MG ODT tab Take 1 tablet (4 mg) by mouth every 8 hours as needed for nausea   venlafaxine (EFFEXOR XR) 37.5 MG 24 hr capsule Take 37.5 mg by mouth daily.       Allergies: No known allergies    Triage and nursing notes were reviewed.    ROS: All other systems were reviewed and are negative    Physical Exam:  Vitals:    19 0519   BP:  112/73   Resp: 14   Temp: 97.8  F (36.6  C)   TempSrc: Oral   SpO2: 98%   Weight: 54 kg (119 lb)     GENERAL APPEARANCE: Alert and oriented, slightly anxious, no apparent distress  HEAD: atraumatic  EYES: PERRL, EOMI  HENT: oral exam benign;   NECK: no adenopathy or masses, trachea is midline  RESP: lungs clear to auscultation - no rales, rhonchi or wheezes  CV: regular rate and rhythm, no significant murmurs or rubs  ABDOMEN: soft, no significant localized tenderness, no masses with slightly hyperactive bowel sounds  EXT: Unremarkable  SKIN: no rash; as above  NEURO: mentation and speech normal; no focal deficits    Labs/Imaging Results: From yesterday  Results for orders placed or performed during the hospital encounter of 02/27/19 (from the past 24 hour(s))   CBC with platelets differential   Result Value Ref Range    WBC 7.6 4.0 - 11.0 10e9/L    RBC Count 4.77 4.4 - 5.9 10e12/L    Hemoglobin 14.4 13.3 - 17.7 g/dL    Hematocrit 41.2 40.0 - 53.0 %    MCV 86 78 - 100 fl    MCH 30.2 26.5 - 33.0 pg    MCHC 35.0 31.5 - 36.5 g/dL    RDW 11.9 10.0 - 15.0 %    Platelet Count 279 150 - 450 10e9/L    Diff Method Automated Method     % Neutrophils 73.4 %    % Lymphocytes 19.7 %    % Monocytes 6.2 %    % Eosinophils 0.1 %    % Basophils 0.3 %    % Immature Granulocytes 0.3 %    Nucleated RBCs 0 0 /100    Absolute Neutrophil 5.6 1.6 - 8.3 10e9/L    Absolute Lymphocytes 1.5 0.8 - 5.3 10e9/L    Absolute Monocytes 0.5 0.0 - 1.3 10e9/L    Absolute Basophils 0.0 0.0 - 0.2 10e9/L    Abs Immature Granulocytes 0.0 0 - 0.4 10e9/L    Absolute Nucleated RBC 0.0    Comprehensive metabolic panel   Result Value Ref Range    Sodium 140 133 - 144 mmol/L    Potassium 4.1 3.4 - 5.3 mmol/L    Chloride 106 94 - 109 mmol/L    Carbon Dioxide 26 20 - 32 mmol/L    Anion Gap 8 3 - 14 mmol/L    Glucose 106 (H) 70 - 99 mg/dL    Urea Nitrogen 8 7 - 30 mg/dL    Creatinine 0.86 0.66 - 1.25 mg/dL    GFR Estimate >90 >60 mL/min/[1.73_m2]    GFR Estimate If  Black >90 >60 mL/min/[1.73_m2]    Calcium 8.8 8.5 - 10.1 mg/dL    Bilirubin Total 0.8 0.2 - 1.3 mg/dL    Albumin 4.4 3.4 - 5.0 g/dL    Protein Total 8.0 6.8 - 8.8 g/dL    Alkaline Phosphatase 93 40 - 150 U/L    ALT 33 0 - 70 U/L    AST 25 0 - 45 U/L   Lipase   Result Value Ref Range    Lipase 86 73 - 393 U/L           Impression:  Final diagnoses:   Abdominal pain, epigastric   Medication side effects, possible         ED Course & Medical Decision Making (Plan):  Deven Lake is a 23 year old male with possible medication side effects after taking hydroxyzine last night before bed.  There is the first time he is taking hydroxyzine, and he woke up about 5 hours later with symptoms of palpitations, dizziness, and mid abdominal pain.  He did not think this was right, called the nurse triage line, and was told to come to the emergency department.  Earlier this week, he had ingested mushrooms for the first time and developed toxic side effects.  He was still experiencing some nausea and dizziness a couple of days later, and came into the ED on February 26.  He was seen again yesterday with insomnia and blood.  Patient reports that his symptoms have resolved except for the abdominal pain, but he has not had a bowel movement in 2-3 days.  His lab results from yesterday revealed no signs of liver toxicity or abnormality of his lipase level.  I recommended that we monitor for further symptoms today.  He has an appointment with his primary care provider at 1 PM.  Avoid any unnecessary medications and no more recreational drug use.        Written after-visit summary and instructions were given at the time of discharge.          Follow Up Plan:  Savanna Tdod  [facility]  [address] [place] [address]  [phone]    Today  As planned        Discharge Meds: None        This note was completed in part using Dragon voice recognition, and may contain word and grammatical errors.        Gabino Lieberman,  MD  02/28/19 0624

## 2019-02-28 NOTE — DISCHARGE INSTRUCTIONS
Begin MiraLAX or milk of magnesia today to help promote stooling.  Monitor for any further symptoms.  Keep your appointment with your primary care provider later this afternoon.  Return to the emergency department at any time if your symptoms worsen.  Avoid any unnecessary medications and to not use any recreational drugs.

## 2019-02-28 NOTE — TELEPHONE ENCOUNTER
Reason for Call/Nurse Assessment:  22 y/o male who was recently prescribed a new medication in the ER yesterday, Hydroxyzine. He was using itto get some sleep last night, took first and only dose at midnight and now wakes with Lightheadedness and feeling nauseous. He has not passed out but states he feels terrible.      RN Action/Disposiiton:  Per protocols, advised caller that likely a reaction to the new medication, stop taking now. Wait until MD sees you. You also need to e seen in the ER now for possibel adverse reaction, he verbalized plan and is going now.     Gail Norman RN - [facility]  02/28/2019         5:06AM    Reason for Disposition    Dizziness, lightheadedness, or weakness    Additional Information    Negative: Passed out (i.e., lost consciousness, collapsed and was not responding)    Negative: Shock suspected (e.g., cold/pale/clammy skin, too weak to stand, low BP, rapid pulse)    Negative: Difficult to awaken or acting confused  (e.g., disoriented, slurred speech)    Negative: Visible sweat on face or sweat dripping down face    Negative: Unable to walk, or can only walk with assistance (e.g., requires support)    Negative: [1] Received SHOCK from implantable cardiac defibrillator AND [2] persisting symptoms (i.e., palpitations, lightheadedness)    Negative: Sounds like a life-threatening emergency to the triager    Negative: Difficulty breathing    Protocols used: HEART RATE AND HEARTBEAT QUESTIONS-ADULT-

## 2019-02-28 NOTE — ED TRIAGE NOTES
Presents to the ER stating that the Vistaril he took last night did not work. I awoke and I felt like my heart was racing and felt shakey. Complains of nausea but did not take Zofran for fear it would react with the other medications he was on. He has has several ER visits since using mushrooms earlier this week. Appears stable at this time

## 2019-02-28 NOTE — ED NOTES
Discussed foods and fluids that will be beneficial in helping pt feel better. States Berries help him and water with electrolytes

## 2019-03-01 NOTE — TELEPHONE ENCOUNTER
Deven was seen in the ER early this morning for pain in abdomen.    He reports that he has since had a bowel movement after taking laxatives.    He reports the pain in his abdomen is primarily on the left side, continuous and worse than when he was last seen in the ER.    Per protocol, advised to be seen in ER.  Patient reports that he will have his brother take him.    Raquel Lora RN  [facility]        Reason for Disposition    [1] SEVERE pain (e.g., excruciating) AND [2] present > 1 hour    Additional Information    Negative: Shock suspected (e.g., cold/pale/clammy skin, too weak to stand, low BP, rapid pulse)    Negative: Difficult to awaken or acting confused  (e.g., disoriented, slurred speech)    Negative: Passed out (i.e., lost consciousness, collapsed and was not responding)    Negative: Sounds like a life-threatening emergency to the triager    Protocols used: ABDOMINAL PAIN - MALE-ADULT-

## 2019-03-01 NOTE — ED PROVIDER NOTES
ED Provider Note     Deven Lake  [id number]  2019      CC:     Chief Complaint   Patient presents with     Abdominal Pain          History is obtained from the patient.    HPI: Deven Lake is a 23 year old male presenting with persistent left-sided abdominal pain.  Patient was here in the emergency department yesterday with complaints of left lower quadrant abdominal pain.  He did not have a bowel movement for 2-3 days, and I recommended that he try a laxative.  He states that he did have some results with some watery stools after he took a laxative.  His pain persists and has a range of 5-7/10.  Pain is worse with certain position and movement.  He does not have any fever, nausea, vomiting.  He had experimented with mushrooms last week, and had some acute toxicity from the mushrooms.  He has had several additional ED visits with concerns for possible delayed reactions.  There has been no signs of any delayed hepatotoxicity.    PMH/Problem List:   Past Medical History:   Diagnosis Date     Depressive disorder      Uncomplicated asthma        PSH: History reviewed. No pertinent surgical history.    MEDS:     No current facility-administered medications on file prior to encounter.   Current Outpatient Medications on File Prior to Encounter:  ALBUTEROL SULFATE 0.083 % IN NEBU 1 prefilled vial per neb Q 4 hrs, prn   hydrOXYzine (VISTARIL) 25 MG capsule Take 1 capsule (25 mg) by mouth 3 times daily as needed for anxiety or other (trouble sleeping)   [] ondansetron (ZOFRAN ODT) 4 MG ODT tab Take 1 tablet (4 mg) by mouth every 8 hours as needed for nausea   venlafaxine (EFFEXOR XR) 37.5 MG 24 hr capsule Take 37.5 mg by mouth daily.       Allergies: No known allergies    Triage and nursing notes were reviewed.    ROS: All other systems were reviewed and are negative    Physical Exam:  Vitals:    19 2249   BP: 112/77   Pulse:  77   Resp: 16   Temp: 98.1  F (36.7  C)   TempSrc: Oral   SpO2: 98%   Weight: 54 kg (119 lb)     GENERAL APPEARANCE: Alert, mild distress, no respiratory difficulty  HEAD: atraumatic  EYES: PERRL, EOMI; no scleral icterus  HENT: oral exam benign  NECK: no adenopathy or masses, trachea is midline  RESP: lungs clear to auscultation - no rales, rhonchi or wheezes  CV: regular rate and rhythm, no significant murmurs or rubs  ABDOMEN: soft, localized left-sided abdominal tenderness.  There is tenderness in the abdominal wall.  No hematoma or ecchymosis.  No posterior flank tenderness  EXT: Unremarkable  SKIN: no rash; as above      Labs/Imaging Results:  Results for orders placed or performed during the hospital encounter of 02/28/19 (from the past 24 hour(s))   KUB XR    Narrative    XR KUB  2/28/2019 11:01 PM     HISTORY: Left flank pain.    COMPARISON: None.       Impression    IMPRESSION: There is a 0.5 cm calcification in the left pelvis which  may be a distal ureteral stone or phlebolith. No other calcifications  are seen to overlie the kidneys or expected course of the ureters.  Moderate amount of stool in the colon.   UA reflex to Microscopic   Result Value Ref Range    Color Urine Yellow     Appearance Urine Clear     Glucose Urine Negative NEG^Negative mg/dL    Bilirubin Urine Negative NEG^Negative    Ketones Urine Negative NEG^Negative mg/dL    Specific Gravity Urine 1.015 1.003 - 1.035    Blood Urine Negative NEG^Negative    pH Urine 6.0 5.0 - 7.0 pH    Protein Albumin Urine Negative NEG^Negative mg/dL    Urobilinogen mg/dL 0.0 0.0 - 2.0 mg/dL    Nitrite Urine Negative NEG^Negative    Leukocyte Esterase Urine Negative NEG^Negative    Source Midstream Urine    Urine Drugs of Abuse Screen Panel 13   Result Value Ref Range    Cannabinoids (91-vew-8-carboxy-9-THC) Detected, Abnormal Result (A) NDET^Not Detected ng/mL    Phencyclidine (Phencyclidine) Not Detected NDET^Not Detected ng/mL    Cocaine  (Benzoylecgonine) Not Detected NDET^Not Detected ng/mL    Methamphetamine (d-Methamphetamine) Not Detected NDET^Not Detected ng/mL    Opiates (Morphine) Not Detected NDET^Not Detected ng/mL    Amphetamine (d-Amphetamine) Not Detected NDET^Not Detected ng/mL    Benzodiazepines (Nordiazepam) Not Detected NDET^Not Detected ng/mL    Tricyclic Antidepressants (Desipramine) Not Detected NDET^Not Detected ng/mL    Methadone (Methadone) Not Detected NDET^Not Detected ng/mL    Barbiturates (Butalbital) Not Detected NDET^Not Detected ng/mL    Oxycodone (Oxycodone) Not Detected NDET^Not Detected ng/mL    Propoxyphene (Norpropoxyphene) Not Detected NDET^Not Detected ng/mL    Buprenorphine (Buprenorphine) Not Detected NDET^Not Detected ng/mL             Impression:  Final diagnoses:   Left sided abdominal pain         ED Course & Medical Decision Making (Plan):  Deven Lake is a 23 year old male with 2 days of persistent left-sided abdominal tenderness.  On exam, patient is tenderness in the abdominal wall and along the left anterior gutter.  There is no tenderness on the posterior costovertebral angle.  Patient's vital signs were normal with temp of 98.1, blood pressure of 112/77.  A urinalysis was obtained to rule out microscopic hematuria and UTI.  A KUB x-ray was ordered as well.  Urinalysis is clear without any signs of blood in the urine, urine tox screen is positive for cannabinoids, and KUB x-ray reveals a 0.5 cm calcification in the left pelvis which is likely a phlebolith given the absence of microscopic hematuria.  There is a moderate amount of stool in the colon.  I discussed the findings with the patient, and suspect that his pain may indeed be related to constipation.  I would like him to begin a bowel program using MiraLAX twice a day for the next 7-10 days.  He states that he has been experiencing some restless leg symptoms after taking Zofran consistently for 2-3 days and now discontinuing the medication.   He wanted to try some over-the-counter natural medication to see if it would help and I am not opposed to this but also explained to him that it should resolve the longer he is off the Zofran.  I asked him to follow-up with his primary care provider in 3 days if not improving.  Return to the ED at any time if symptoms worsen.          Written after-visit summary and instructions were given at the time of discharge.          Follow Up Plan:  Savanna Todd  [facility]  [address] [place] [address]  [phone]    In 3 days  if not improving        Discharge Meds: Over-the-counter MiraLAX        This note was completed in part using Dragon voice recognition, and may contain word and grammatical errors.        Gabino Lieberman MD  03/01/19 0101

## 2019-03-01 NOTE — DISCHARGE INSTRUCTIONS
Your x-rays show a moderate amount of retained stool and gas.  Begin MiraLAX 1 capful in a glass of water or Gatorade twice a day for the next 7-10 days.  Your urinalysis did not reveal any evidence of a urinary tract infection or kidney stone.  Drink plenty of fluids throughout the day.  Follow-up with your primary care provider in 3-5 days if not improving.

## 2019-03-12 ENCOUNTER — HOSPITAL ENCOUNTER (EMERGENCY)
Facility: CLINIC | Age: 24
Discharge: HOME | End: 2019-03-12
Attending: EMERGENCY MEDICINE | Admitting: EMERGENCY MEDICINE
Payer: COMMERCIAL

## 2019-03-12 VITALS
BODY MASS INDEX: 18.28 KG/M2 | OXYGEN SATURATION: 96 % | WEIGHT: 123.8 LBS | HEART RATE: 80 BPM | RESPIRATION RATE: 16 BRPM | DIASTOLIC BLOOD PRESSURE: 77 MMHG | TEMPERATURE: 98.2 F | SYSTOLIC BLOOD PRESSURE: 123 MMHG

## 2019-03-12 DIAGNOSIS — F41.1 ANXIETY REACTION: ICD-10-CM

## 2019-03-12 DIAGNOSIS — R11.2 NON-INTRACTABLE VOMITING WITH NAUSEA, UNSPECIFIED VOMITING TYPE: ICD-10-CM

## 2019-03-12 PROCEDURE — 25000132 ZZH RX MED GY IP 250 OP 250 PS 637: Performed by: EMERGENCY MEDICINE

## 2019-03-12 PROCEDURE — 99284 EMERGENCY DEPT VISIT MOD MDM: CPT | Mod: Z6 | Performed by: EMERGENCY MEDICINE

## 2019-03-12 PROCEDURE — 99283 EMERGENCY DEPT VISIT LOW MDM: CPT | Performed by: EMERGENCY MEDICINE

## 2019-03-12 PROCEDURE — 25000131 ZZH RX MED GY IP 250 OP 636 PS 637: Performed by: EMERGENCY MEDICINE

## 2019-03-12 RX ORDER — ONDANSETRON 4 MG/1
4 TABLET, ORALLY DISINTEGRATING ORAL ONCE
Status: COMPLETED | OUTPATIENT
Start: 2019-03-12 | End: 2019-03-12

## 2019-03-12 RX ORDER — ALPRAZOLAM 0.25 MG
0.25 TABLET ORAL ONCE
Status: COMPLETED | OUTPATIENT
Start: 2019-03-12 | End: 2019-03-12

## 2019-03-12 RX ADMIN — ALPRAZOLAM 0.25 MG: 0.25 TABLET ORAL at 18:07

## 2019-03-12 RX ADMIN — ONDANSETRON 4 MG: 4 TABLET, ORALLY DISINTEGRATING ORAL at 19:04

## 2019-03-12 ASSESSMENT — ENCOUNTER SYMPTOMS
NAUSEA: 1
DIARRHEA: 0
FATIGUE: 0
SORE THROAT: 0
DYSPHORIC MOOD: 1
VOMITING: 1
WEAKNESS: 1
SLEEP DISTURBANCE: 0
VOICE CHANGE: 0
ABDOMINAL PAIN: 0
SHORTNESS OF BREATH: 0
DYSURIA: 0
LIGHT-HEADEDNESS: 0
NECK PAIN: 0
APPETITE CHANGE: 1
ARTHRALGIAS: 0
NECK STIFFNESS: 0
MYALGIAS: 0
CHEST TIGHTNESS: 0
NERVOUS/ANXIOUS: 1
PALPITATIONS: 0
CHILLS: 0
WOUND: 0
CONSTIPATION: 0
HEADACHES: 0
NUMBNESS: 0
BACK PAIN: 0
RHINORRHEA: 0
FREQUENCY: 0
FEVER: 0
COUGH: 0
ACTIVITY CHANGE: 0

## 2019-03-12 NOTE — ED PROVIDER NOTES
History     Chief Complaint   Patient presents with     Generalized Weakness     HPI  Deven Lake is a 23 year old male with history of depression and asthma as well as a strong family history of bipolar and anxiety presenting for evaluation of abnormal sensations today.  Patient reports intermittent episodes of nausea and generalized weakness, most present in his lower extremities.  He reports shaky sensation in both legs and feeling as though his legs might give out however he has not fallen or had any injury.  Denies any chest pain or palpitations.  No trouble breathing.  Denies headache or lightheadedness.  Patient reports he is feeling weak with no energy.  Denies fever or chills.  No cough.  Denies abdominal pain but does have nausea and reports that he has been dry heaving several times today.  Overall his symptoms have improved substantially since arrival to the ED with his mother present.  Mother reports some similar symptoms with her anxiety and panic attacks in the past.  Admits to regular marijuana use.  Denies any other recreational drug use currently.  Mother reports he has an intake appointment with a mental health provider tomorrow to have him more thoroughly evaluated for underlying mental health issues including anxiety and possible bipolar    Allergies:  Allergies   Allergen Reactions     No Known Allergies        Problem List:    Patient Active Problem List    Diagnosis Date Noted     Mild persistent asthma 10/31/2008     Priority: Medium        Past Medical History:    Past Medical History:   Diagnosis Date     Depressive disorder      Uncomplicated asthma        Past Surgical History:    History reviewed. No pertinent surgical history.    Family History:    History reviewed. No pertinent family history.    Social History:  Marital Status:  Single [1]  Social History     Tobacco Use     Smoking status: Never Smoker     Smokeless tobacco: Never Used   Substance Use Topics     Alcohol use:  No     Drug use: Yes     Types: Marijuana     Comment: mushroom saturday        Medications:      ALBUTEROL SULFATE 0.083 % IN NEBU   hydrOXYzine (VISTARIL) 25 MG capsule   venlafaxine (EFFEXOR XR) 37.5 MG 24 hr capsule         Review of Systems   Constitutional: Positive for appetite change (decreased today). Negative for activity change, chills, fatigue and fever.   HENT: Negative for congestion, rhinorrhea, sore throat and voice change.    Eyes: Negative for visual disturbance.   Respiratory: Negative for cough, chest tightness and shortness of breath.    Cardiovascular: Negative for chest pain, palpitations and leg swelling.   Gastrointestinal: Positive for nausea and vomiting (dry heaving). Negative for abdominal pain, constipation and diarrhea.   Genitourinary: Negative for decreased urine volume, dysuria and frequency.   Musculoskeletal: Negative for arthralgias, back pain, myalgias, neck pain and neck stiffness.   Skin: Negative for rash and wound.   Neurological: Positive for weakness (generalized). Negative for light-headedness, numbness and headaches.   Psychiatric/Behavioral: Positive for dysphoric mood. Negative for sleep disturbance and suicidal ideas. The patient is nervous/anxious.    All other systems reviewed and are negative.      Physical Exam   BP: 123/77  Pulse: 80  Heart Rate: 77  Temp: 98.2  F (36.8  C)  Resp: 16  Weight: 56.2 kg (123 lb 12.8 oz)  SpO2: 98 %      Physical Exam   Constitutional: He is oriented to person, place, and time. He appears well-developed and well-nourished. No distress.   HENT:   Head: Normocephalic and atraumatic.   Mouth/Throat: Oropharynx is clear and moist.   Eyes: Conjunctivae and EOM are normal. Pupils are equal, round, and reactive to light.   Cardiovascular: Normal rate, regular rhythm and normal heart sounds.   Pulmonary/Chest: Effort normal and breath sounds normal.   Abdominal: Soft. There is no tenderness.   Neurological: He is alert and oriented to  person, place, and time. He has normal strength. No cranial nerve deficit or sensory deficit. GCS eye subscore is 4. GCS verbal subscore is 5. GCS motor subscore is 6.   Good symmetric lower extremity strength throughout   Skin: Skin is warm and dry. Capillary refill takes less than 2 seconds. He is not diaphoretic.   Psychiatric: His speech is normal and behavior is normal. His mood appears anxious (mild). Thought content is not paranoid. He expresses no homicidal and no suicidal ideation.   Nursing note and vitals reviewed.      ED Course        Procedures                 No results found for this or any previous visit (from the past 24 hour(s)).    Medications   ALPRAZolam (XANAX) tablet 0.25 mg (0.25 mg Oral Given 3/12/19 1807)   ondansetron (ZOFRAN-ODT) ODT tab 4 mg (4 mg Oral Given 3/12/19 1904)     7:08 PM: PT re-assessed. Not significantly changed. Still reporting nausea. Will give odt zofran and ambulate.     Assessments & Plan (with Medical Decision Making)  Well-appearing 23-year-old male with a strong family history of anxiety and bipolar presenting for evaluation of generalized lower extremity weakness, shakiness, nausea.  Normal vital signs on arrival patient continues to have mild uneasy symptoms in his legs.  On exam he has normal strength with no change in sensation.  Exact etiology of his symptoms unclear but strongly suggestive of anxiety and a panic attack.  Treated empirically with alprazolam to assess for symptomatic change.  Significantly changed after alprazolam.  Still with some nausea but remains well-appearing in no distress.  Offered Zofran and will evaluate in the department.  He does not appear to have any true muscular weakness despite his subjective weakness.     I have reviewed the nursing notes.    I have reviewed the findings, diagnosis, plan and need for follow up with the patient.          Medication List      ASK your doctor about these medications    ondansetron 4 MG ODT  tab  Commonly known as:  ZOFRAN ODT  4 mg, Oral, EVERY 8 HOURS PRN  Ask about: Should I take this medication?            Final diagnoses:   Non-intractable vomiting with nausea, unspecified vomiting type   Anxiety reaction       3/12/2019   [facility] EMERGENCY DEPARTMENT     Rodriguez, Edgardo Stacy MD  03/12/19 2034

## 2019-03-12 NOTE — ED AVS SNAPSHOT
[facility] Emergency Department  [address] [place] [address]  Phone:  [phone]  Fax:  [phone]                                    Deven Lake   MRN: [id number]    Department:  [facility] Emergency Department   Date of Visit:  3/12/2019           After Visit Summary Signature Page    I have received my discharge instructions, and my questions have been answered. I have discussed any challenges I see with this plan with the nurse or doctor.    ..........................................................................................................................................  Patient/Patient Representative Signature      ..........................................................................................................................................  Patient Representative Print Name and Relationship to Patient    ..................................................               ................................................  Date                                   Time    ..........................................................................................................................................  Reviewed by Signature/Title    ...................................................              ..............................................  Date                                               Time          22EPIC Rev 08/18

## 2019-03-13 NOTE — ED NOTES
Walked patient around the ER. Patient had no complaints of SOB or Chest pain. No dizziness or nausea noted. Patient did state feels little weak.

## 2023-04-15 NOTE — ED AVS SNAPSHOT
[facility] Emergency Department  [address] [place] [address]  Phone:  [phone]  Fax:  [phone]                                    Deven Lake   MRN: [id number]    Department:  [facility] Emergency Department   Date of Visit:  2/28/2019           After Visit Summary Signature Page    I have received my discharge instructions, and my questions have been answered. I have discussed any challenges I see with this plan with the nurse or doctor.    ..........................................................................................................................................  Patient/Patient Representative Signature      ..........................................................................................................................................  Patient Representative Print Name and Relationship to Patient    ..................................................               ................................................  Date                                   Time    ..........................................................................................................................................  Reviewed by Signature/Title    ...................................................              ..............................................  Date                                               Time          22EPIC Rev 08/18       
Never

## 2024-12-20 ENCOUNTER — HOSPITAL ENCOUNTER (EMERGENCY)
Facility: CLINIC | Age: 29
Discharge: HOME | End: 2024-12-20
Attending: FAMILY MEDICINE | Admitting: FAMILY MEDICINE
Payer: COMMERCIAL

## 2024-12-20 VITALS
DIASTOLIC BLOOD PRESSURE: 78 MMHG | BODY MASS INDEX: 18.3 KG/M2 | TEMPERATURE: 98.6 F | OXYGEN SATURATION: 98 % | RESPIRATION RATE: 18 BRPM | WEIGHT: 123.9 LBS | HEART RATE: 82 BPM | SYSTOLIC BLOOD PRESSURE: 99 MMHG

## 2024-12-20 DIAGNOSIS — J10.1 INFLUENZA A: ICD-10-CM

## 2024-12-20 LAB
FLUAV RNA SPEC QL NAA+PROBE: POSITIVE
FLUBV RNA RESP QL NAA+PROBE: NEGATIVE
RSV RNA SPEC NAA+PROBE: NEGATIVE
SARS-COV-2 RNA RESP QL NAA+PROBE: NEGATIVE

## 2024-12-20 PROCEDURE — 99284 EMERGENCY DEPT VISIT MOD MDM: CPT | Performed by: FAMILY MEDICINE

## 2024-12-20 PROCEDURE — 87637 SARSCOV2&INF A&B&RSV AMP PRB: CPT | Performed by: FAMILY MEDICINE

## 2024-12-20 RX ORDER — CODEINE PHOSPHATE AND GUAIFENESIN 10; 100 MG/5ML; MG/5ML
1-2 SOLUTION ORAL EVERY 4 HOURS PRN
Qty: 120 ML | Refills: 0 | Status: SHIPPED | OUTPATIENT
Start: 2024-12-20

## 2024-12-20 RX ORDER — ONDANSETRON 4 MG/1
4 TABLET, ORALLY DISINTEGRATING ORAL EVERY 8 HOURS PRN
Qty: 10 TABLET | Refills: 0 | Status: SHIPPED | OUTPATIENT
Start: 2024-12-20

## 2024-12-20 ASSESSMENT — COLUMBIA-SUICIDE SEVERITY RATING SCALE - C-SSRS
2. HAVE YOU ACTUALLY HAD ANY THOUGHTS OF KILLING YOURSELF IN THE PAST MONTH?: NO
1. IN THE PAST MONTH, HAVE YOU WISHED YOU WERE DEAD OR WISHED YOU COULD GO TO SLEEP AND NOT WAKE UP?: NO
6. HAVE YOU EVER DONE ANYTHING, STARTED TO DO ANYTHING, OR PREPARED TO DO ANYTHING TO END YOUR LIFE?: NO

## 2024-12-20 ASSESSMENT — ACTIVITIES OF DAILY LIVING (ADL): ADLS_ACUITY_SCORE: 41

## 2024-12-20 NOTE — ED PROVIDER NOTES
History     Chief Complaint   Patient presents with    Flu Symptoms     HPI  Deven Lake is a 29 year old male who presents with flulike symptoms that have been going on for 4 days.  Patient was the first 1 to get sick in the household and now everyone is sick with similar symptoms.  Patient is having a cough, runny nose, sore throat, body aches, fatigue and shortness of breath.  Patient is an asthmatic and has been using his inhaler but has not been helping.  Patient has been having high fevers at home.    Allergies:  Allergies   Allergen Reactions    No Known Allergies        Problem List:    Patient Active Problem List    Diagnosis Date Noted    Mild persistent asthma 10/31/2008     Priority: Medium        Past Medical History:    Past Medical History:   Diagnosis Date    Depressive disorder     Uncomplicated asthma        Past Surgical History:    No past surgical history on file.    Family History:    No family history on file.    Social History:  Marital Status:  Single [1]  Social History     Tobacco Use    Smoking status: Never    Smokeless tobacco: Never   Substance Use Topics    Alcohol use: No    Drug use: Yes     Types: Marijuana     Comment: mushroom saturday        Medications:    ALBUTEROL SULFATE 0.083 % IN [facility]  guaiFENesin-codeine (ROBITUSSIN AC) 100-10 MG/5ML solution  hydrOXYzine (VISTARIL) 25 MG capsule  ondansetron (ZOFRAN ODT) 4 MG ODT tab  venlafaxine (EFFEXOR XR) 37.5 MG 24 hr capsule          Review of Systems   All other systems reviewed and are negative.      Physical Exam   BP: 99/78  Pulse: 83  Temp: 98.6  F (37  C)  Resp: 18  Weight: 56.2 kg (123 lb 14.4 oz)  SpO2: 100 %      Physical Exam  Vitals and nursing note reviewed.   Constitutional:       General: He is not in acute distress.     Appearance: He is well-developed. He is not diaphoretic.   HENT:      Head: Normocephalic and atraumatic.   Eyes:      Conjunctiva/sclera: Conjunctivae normal.   Cardiovascular:      Rate and  Rhythm: Normal rate and regular rhythm.      Heart sounds: Normal heart sounds. No murmur heard.  Pulmonary:      Effort: Pulmonary effort is normal. No respiratory distress.      Breath sounds: Normal breath sounds. No stridor. No wheezing.   Abdominal:      General: Bowel sounds are normal. There is no distension.      Palpations: Abdomen is soft.      Tenderness: There is no abdominal tenderness. There is no guarding.   Musculoskeletal:         General: Normal range of motion.      Cervical back: Normal range of motion.   Skin:     General: Skin is warm and dry.      Findings: No rash.   Neurological:      Mental Status: He is alert and oriented to person, place, and time.   Psychiatric:         Judgment: Judgment normal.         ED Course        Procedures    Results for orders placed or performed during the hospital encounter of 12/20/24   Influenza A/B, RSV and SARS-CoV2 PCR (COVID-19) Nose     Status: Abnormal    Specimen: Nose; Swab   Result Value Ref Range    Influenza A PCR Positive (A) Negative    Influenza B PCR Negative Negative    RSV PCR Negative Negative    SARS CoV2 PCR Negative Negative    Narrative    Testing was performed using the Xpert Xpress CoV2/Flu/RSV Assay on the [email or web address] GeneXpert Instrument. This test should be ordered for the detection of SARS-CoV2, influenza, and RSV viruses in individuals with signs and symptoms of respiratory tract infection. This test is for in vitro diagnostic use under the [place] FDA for laboratories certified under CLIA to perform high or moderate complexity testing. This test has been [place] FDA cleared. A negative result does not rule out the presence of PCR inhibitors in the specimen or target RNA in concentration below the limit of detection for the assay. If only one viral target is positive but coinfection with multiple targets is suspected, the sample should be re-tested with another FDA cleared, approved, or authorized test, if coninfection would change clinical  management. This test was validated by the [facility]. These laboratories are certified under the Clinical Laboratory Improvement Amendments of 1988 (CLIA-88) as qualified to perfom high complexity laboratory testing.     Influenza A came back positive which certainly fits clinically.  Patient is on day 4 of the symptoms, reassured him and told him things should start to get better over the next 24 hours.  Patient was given a prescription for some Robitussin with codeine to help with the cough.  Patient will be discharged at this time.    Assessments & Plan (with Medical Decision Making)  Influenza A     I have reviewed the nursing notes.    I have reviewed the findings, diagnosis, plan and need for follow up with the patient.      New Prescriptions    GUAIFENESIN-CODEINE (ROBITUSSIN AC) 100-10 MG/5ML SOLUTION    Take 5-10 mLs by mouth every 4 hours as needed for cough.    ONDANSETRON (ZOFRAN ODT) 4 MG ODT TAB    Take 1 tablet (4 mg) by mouth every 8 hours as needed for nausea.       Final diagnoses:   Influenza A       12/20/2024   [facility] EMERGENCY DEPT       John Gleason MD  12/20/24 1569

## 2024-12-20 NOTE — Clinical Note
Deven Lake was seen and treated in our emergency department on 12/20/2024.  He may return to work on 12/22/2024.       If you have any questions or concerns, please don't hesitate to call.      John Gleason MD

## 2024-12-20 NOTE — ED TRIAGE NOTES
Patient reports feeling winded more easily over the last 3 days, as well as having a cough. Now reports his spouse and kid are sick as well.     Triage Assessment (Adult)       Row Name 12/20/24 0243          Triage Assessment    Airway WDL WDL        Respiratory WDL    Respiratory WDL X        Skin Circulation/Temperature WDL    Skin Circulation/Temperature WDL WDL        Cardiac WDL    Cardiac WDL WDL        Peripheral/Neurovascular WDL    Peripheral Neurovascular WDL WDL        Cognitive/Neuro/Behavioral WDL    Cognitive/Neuro/Behavioral WDL WDL

## 2025-07-01 ENCOUNTER — OFFICE VISIT (OUTPATIENT)
Dept: URGENT CARE | Facility: URGENT CARE | Age: 30
End: 2025-07-01
Payer: COMMERCIAL

## 2025-07-01 VITALS
DIASTOLIC BLOOD PRESSURE: 75 MMHG | OXYGEN SATURATION: 100 % | RESPIRATION RATE: 20 BRPM | SYSTOLIC BLOOD PRESSURE: 113 MMHG | WEIGHT: 165.2 LBS | TEMPERATURE: 97.8 F | HEART RATE: 61 BPM | BODY MASS INDEX: 23.65 KG/M2 | HEIGHT: 70 IN

## 2025-07-01 DIAGNOSIS — J45.31 MILD PERSISTENT ASTHMA WITH ACUTE EXACERBATION: Primary | ICD-10-CM

## 2025-07-01 PROCEDURE — 99203 OFFICE O/P NEW LOW 30 MIN: CPT

## 2025-07-01 RX ORDER — ALBUTEROL SULFATE 0.83 MG/ML
2.5 SOLUTION RESPIRATORY (INHALATION) EVERY 6 HOURS PRN
Qty: 90 ML | Refills: 0 | Status: SHIPPED | OUTPATIENT
Start: 2025-07-01

## 2025-07-01 NOTE — PROGRESS NOTES
"ASSESSMENT:   (J45.31) Mild persistent asthma with acute exacerbation  (primary encounter diagnosis)  Plan: albuterol (PROVENTIL) (2.5 MG/3ML) 0.083% neb         solution    PLAN:   Asthma patient instructions discussed and provided.  Informed the patient that albuterol neb solution has been refilled for him today in the clinic and that he should follow-up with his primary care provider for recheck of his asthma.  We discussed going to the emergency department with any new or worsening symptoms.  Work note provided.  Patient acknowledged his understanding of the above plan.    The use of Dragon/[employer] dictation services may have been used to construct the content in this note; any grammatical or spelling errors are non-intentional. Please contact the author of this note directly if you are in need of any clarification.      Edgardo Bunch, RANJEET CNP    SUBJECTIVE:   Deven Lake is a 29 year old male seen today with exacerbation of asthma symptoms for the past two days.   Current symptoms include: wheezing, non-productive cough, and dyspnea on exertion.   He reports symptoms are worse with humidity.   Current asthma medications: albuterol inhaler.   Patient denies smoke cigarettes.    Patient reports having an albuterol neb solution in the past which he states has helped with his symptoms.  He no longer has any albuterol neb solution.    ROS:  Negative except noted above.     OBJECTIVE:   Blood pressure 113/75, pulse 61, temperature 97.8  F (36.6  C), temperature source Tympanic, resp. rate 20, height 1.765 m (5' 9.5\"), weight 74.9 kg (165 lb 3.2 oz), SpO2 100%.   Exam:  GENERAL APPEARANCE: healthy, alert and no distress  EYES: EOMI,  PERRL  HENT: nose and mouth without ulcers or lesions, oral mucous membranes moist, and oropharynx clear  NECK: no adenopathy, no asymmetry, masses, or scars and thyroid normal to palpation  RESP: lungs clear to auscultation - no rales, rhonchi or wheezes  CV: regular " rates and rhythm, normal S1 S2, no S3 or S4 and no murmur, click or rub -  SKIN: no suspicious lesions or rashes

## 2025-07-01 NOTE — LETTER
July 1, 2025      Deven Lake  [address]  [place] [facility] [address]        To Whom It May Concern:    Deven Lake  was seen on July 1, 2025.  Please excuse him from work until July 3rd due to illness.        Sincerely,        RANJEET Catherine CNP    Electronically signed

## 2025-07-01 NOTE — PROGRESS NOTES
Urgent Care Clinic Visit    Chief Complaint   Patient presents with    Asthma     Concerned that he's having an asthma flare.  States that the emergency inhaler is not working for him.               7/1/2025     6:52 PM   Additional Questions   Roomed by TP   Accompanied by Self

## 2025-07-01 NOTE — PATIENT INSTRUCTIONS
Albuterol neb refilled for you today.  Follow up with your primary care provider for a recheck of your asthma.  Go to the emergency department with any new or worsening symptoms.